# Patient Record
Sex: MALE | Race: WHITE | NOT HISPANIC OR LATINO | Employment: FULL TIME | URBAN - METROPOLITAN AREA
[De-identification: names, ages, dates, MRNs, and addresses within clinical notes are randomized per-mention and may not be internally consistent; named-entity substitution may affect disease eponyms.]

---

## 2021-07-20 ENCOUNTER — OFFICE VISIT (OUTPATIENT)
Dept: URGENT CARE | Facility: PHYSICIAN GROUP | Age: 51
End: 2021-07-20
Payer: COMMERCIAL

## 2021-07-20 ENCOUNTER — HOSPITAL ENCOUNTER (OUTPATIENT)
Dept: RADIOLOGY | Facility: MEDICAL CENTER | Age: 51
End: 2021-07-20
Attending: PHYSICIAN ASSISTANT
Payer: COMMERCIAL

## 2021-07-20 VITALS
WEIGHT: 210 LBS | TEMPERATURE: 98.6 F | RESPIRATION RATE: 16 BRPM | BODY MASS INDEX: 30.06 KG/M2 | SYSTOLIC BLOOD PRESSURE: 120 MMHG | HEIGHT: 70 IN | HEART RATE: 96 BPM | OXYGEN SATURATION: 100 % | DIASTOLIC BLOOD PRESSURE: 60 MMHG

## 2021-07-20 DIAGNOSIS — Z23 NEED FOR DIPHTHERIA-TETANUS-PERTUSSIS (TDAP) VACCINE: ICD-10-CM

## 2021-07-20 DIAGNOSIS — S61.209A AVULSION OF SKIN OF FINGER WITHOUT COMPLICATION, INITIAL ENCOUNTER: ICD-10-CM

## 2021-07-20 DIAGNOSIS — S61.235A PUNCTURE WOUND OF LEFT RING FINGER: ICD-10-CM

## 2021-07-20 PROCEDURE — 73140 X-RAY EXAM OF FINGER(S): CPT | Mod: LT

## 2021-07-20 PROCEDURE — 99203 OFFICE O/P NEW LOW 30 MIN: CPT | Mod: 25 | Performed by: PHYSICIAN ASSISTANT

## 2021-07-20 PROCEDURE — 90471 IMMUNIZATION ADMIN: CPT | Performed by: PHYSICIAN ASSISTANT

## 2021-07-20 PROCEDURE — 90715 TDAP VACCINE 7 YRS/> IM: CPT | Performed by: PHYSICIAN ASSISTANT

## 2021-07-20 ASSESSMENT — ENCOUNTER SYMPTOMS
TINGLING: 0
SENSORY CHANGE: 0

## 2021-07-20 NOTE — PROGRESS NOTES
"Subjective:     Juwan Velez  is a 51 y.o. male who presents for Laceration (drilled finger this morning, just wants a tetnus shot )        Patient identity confirmed using two patient identifiers of last name and date of birth.    Patient presents to urgent care with puncture wound left ring finger following drill accident that occurred earlier this morning at home.  Patient reports that he was drilling with a fair amount of force to install a TV mount when the drill slipped and impaled his left ring finger along the lateral boarder. Patient is in need of tetanus as he believes it has been > 5 years since his last.     Laceration           Review of Systems   Skin:        Flap like avulsion/laceration left ring finger   Neurological: Negative for tingling and sensory change.   All other systems reviewed and are negative.    No Known Allergies  Past medical history, family history, surgical history and social history are reviewed and updated in the record today.     Objective:   /60   Pulse 96   Temp 37 °C (98.6 °F) (Temporal)   Resp 16   Ht 1.778 m (5' 10\")   Wt 95.3 kg (210 lb)   SpO2 100%   BMI 30.13 kg/m²   Physical Exam  Vitals reviewed.   Constitutional:       General: He is not in acute distress.     Appearance: He is well-developed. He is not ill-appearing or toxic-appearing.   HENT:      Head: Normocephalic and atraumatic.      Jaw: There is normal jaw occlusion.      Right Ear: External ear normal.      Left Ear: External ear normal.   Eyes:      General: Lids are normal.      Extraocular Movements: Extraocular movements intact.      Conjunctiva/sclera: Conjunctivae normal.      Pupils: Pupils are equal, round, and reactive to light.   Cardiovascular:      Rate and Rhythm: Normal rate and regular rhythm.      Heart sounds: Normal heart sounds.   Pulmonary:      Effort: Pulmonary effort is normal. No respiratory distress.      Breath sounds: Normal breath sounds.   Musculoskeletal:    "      General: Normal range of motion.      Left hand: Bony tenderness present.        Hands:       Cervical back: Normal range of motion and neck supple.      Comments: Left ring finger lateral border distal to DIP with 1.25 cm flap-like laceration/puncture wound with visible adipose tissue, portion of flap is nonviable as it is extremely thin.  Distal neurovascular intact.  + Tenderness of the tip of the finger.   Skin:     General: Skin is warm and dry.      Findings: No rash.   Neurological:      Mental Status: He is alert and oriented to person, place, and time.      Cranial Nerves: No cranial nerve deficit.      Sensory: No sensory deficit.      Motor: Motor function is intact.      Coordination: Coordination is intact.   Psychiatric:         Attention and Perception: Attention normal.         Mood and Affect: Mood and affect normal.         Speech: Speech normal.         Behavior: Behavior normal.         Thought Content: Thought content normal.         Cognition and Memory: Cognition normal.         Judgment: Judgment normal.          Assessment/Plan:   1. Avulsion of skin of finger without complication, initial encounter  - Tdap =>8yo IM  - DX-FINGER(S) 2+ LEFT; Future    2. Need for diphtheria-tetanus-pertussis (Tdap) vaccine  - DX-FINGER(S) 2+ LEFT; Future    3. Puncture wound of left ring finger  - DX-FINGER(S) 2+ LEFT; Future      Wound is cleansed with Hibiclens and water.  Small portion of nonviable flap is excised.  Tetanus updated today.  Given the forceful nature of the injury as well as bony tenderness of the distal portion of the finger x-rays obtained to rule out open fracture.    Xray is obtained, read by radiologist and reviewed by myself with findings as follows:      RADIOLOGY RESULTS   DX-FINGER(S) 2+ LEFT    Result Date: 7/20/2021 7/20/2021 3:09 PM HISTORY/REASON FOR EXAM:  Pain in fourth digit of left hand after injury to fourth digit of left hand. TECHNIQUE/EXAM DESCRIPTION AND NUMBER OF  VIEWS:   3 views of the LEFT fingers. COMPARISON: None FINDINGS: Bone mineralization is normal.  There is no evidence of fracture or dislocation.  There is no evidence of arthropathy.  Soft tissues are normal.     No evidence of fracture or dislocation.         Keep clean and dry.  Observe closely for signs of infection.  Return for concern for signs for infection.      Differential diagnosis, natural history, supportive care, and indications for immediate follow-up discussed.  Red flag warning symptoms and strict ER/follow-up precautions given.  The patient demonstrated a good understanding and agreed with the treatment plan.    Upon entering exam room I ensured patient was wearing a mask.  This provider wore appropriate PPE throughout entire visit.  Patient wore mask entire visit except for a brief period while examining oropharynx.    Please note that this note was created using voice recognition speech to text software. Every effort has been made to correct obvious errors.  However, I expect there are errors of grammar and possibly context that were not discovered prior to finalizing the note  MAGGIE Eddy PA-C

## 2021-12-29 ENCOUNTER — OFFICE VISIT (OUTPATIENT)
Dept: URGENT CARE | Facility: PHYSICIAN GROUP | Age: 51
End: 2021-12-29
Payer: COMMERCIAL

## 2021-12-29 VITALS
RESPIRATION RATE: 20 BRPM | HEIGHT: 70 IN | OXYGEN SATURATION: 94 % | TEMPERATURE: 97.2 F | SYSTOLIC BLOOD PRESSURE: 114 MMHG | HEART RATE: 83 BPM | WEIGHT: 210 LBS | DIASTOLIC BLOOD PRESSURE: 72 MMHG | BODY MASS INDEX: 30.06 KG/M2

## 2021-12-29 DIAGNOSIS — T50.Z95A ADVERSE EFFECT OF VACCINE, INITIAL ENCOUNTER: ICD-10-CM

## 2021-12-29 DIAGNOSIS — R59.1 LYMPHADENOPATHY: ICD-10-CM

## 2021-12-29 PROCEDURE — 99213 OFFICE O/P EST LOW 20 MIN: CPT | Performed by: NURSE PRACTITIONER

## 2021-12-30 ASSESSMENT — ENCOUNTER SYMPTOMS
CHILLS: 0
FEVER: 0
MYALGIAS: 0
NAUSEA: 0

## 2021-12-30 NOTE — PROGRESS NOTES
Juwan Velez is a 51 y.o. male who presents for Arm Pain (Pt states he got his booster and also his flu on hes left arm now it is swollen under armpit;2x days )      HPI this new problem.  Juwan is 51-year-old male patient presents with left axilla pain.  He received his influenza vaccine and his Covid booster 2 days ago in his left arm.  He then noted that his underarm was swollen and tender.  He feels that they are lymph nodes that are swollen.  Treatments tried none.  No other aggravating alleviating factors.  He is currently got counted to know if this was normal or if this is a complication of his vaccines.  He is feeling healthy and well other than his arm.  He has known fevers, chills, body aches.    Review of Systems   Constitutional: Negative for chills and fever.   Gastrointestinal: Negative for nausea.   Musculoskeletal: Negative for joint pain and myalgias.       Allergies:     No Known Allergies    PMSFS Hx:  Past Medical History:   Diagnosis Date   • Snoring      Past Surgical History:   Procedure Laterality Date   • INGUINAL HERNIA REPAIR ROBOTIC Right 8/2/2016    Procedure: INGUINAL HERNIA REPAIR ROBOTIC with mesh ;  Surgeon: Jeet Cobb M.D.;  Location: SURGERY SAME DAY [facility] ORS;  Service:    • UMBILICAL HERNIA REPAIR N/A 8/2/2016    Procedure: UMBILICAL HERNIA REPAIR Incacerated supraumbilical with mesh ;  Surgeon: Jeet Cobb M.D.;  Location: SURGERY SAME DAY [facility] ORS;  Service:      No family history on file.  Social History     Tobacco Use   • Smoking status: Never Smoker   • Smokeless tobacco: Never Used   Substance Use Topics   • Alcohol use: No     Alcohol/week: 0.0 oz       Problems:   Patient Active Problem List   Diagnosis   • Hernia, inguinal   • Hernia, umbilical, with obstruction       Medications:   Current Outpatient Medications on File Prior to Visit   Medication Sig Dispense Refill   • oxycodone-acetaminophen (PERCOCET) 5-325 MG Tab Take 1-2 Tabs by mouth  "every four hours as needed. (Patient not taking: Reported on 7/20/2021) 35 Tab 0     No current facility-administered medications on file prior to visit.          Objective:     /72 (BP Location: Right arm, Patient Position: Sitting, BP Cuff Size: Adult)   Pulse 83   Temp 36.2 °C (97.2 °F) (Temporal)   Resp 20   Ht 1.778 m (5' 10\")   Wt 95.3 kg (210 lb)   SpO2 94%   BMI 30.13 kg/m²     Physical Exam  Vitals and nursing note reviewed.   Constitutional:       Appearance: Normal appearance. He is normal weight.   Cardiovascular:      Rate and Rhythm: Normal rate.      Pulses: Normal pulses.      Heart sounds: Normal heart sounds.   Pulmonary:      Effort: Pulmonary effort is normal.      Breath sounds: Normal breath sounds.   Chest:   Breasts:      Left: Axillary adenopathy present.       Lymphadenopathy:      Upper Body:      Left upper body: Axillary adenopathy present.   Skin:     General: Skin is warm.      Capillary Refill: Capillary refill takes less than 2 seconds.      Findings: No erythema.   Neurological:      Mental Status: He is alert and oriented to person, place, and time.   Psychiatric:         Mood and Affect: Mood normal.         Behavior: Behavior normal.         Thought Content: Thought content normal.         Assessment /Associated Orders:      1. Adverse effect of vaccine, initial encounter     2. Lymphadenopathy           Medical Decision Making:    Pt is clinically stable at today's acute urgent care visit.  No acute distress noted. Appropriate for outpatient management at this time.   Acute problem today with uncertain prognosis.   Reaction to covid vaccination  OTC  analgesic of choice (acetaminophen or NSAID). Follow manufactures dosing and safety precautions.   Warm cold compresses BiD prn     Advised to follow-up with the primary care provider for recheck, reevaluation, and consideration of further management if necessary.   Discussed management options (risks,benefits, and " alternatives to treatment). Expressed understanding and the treatment plan was agreed upon. Questions were encouraged and answered   Return to urgent care prn if new or worsening sx or if there is no improvement in condition prn.      I personally reviewed prior external notes and test results pertinent to today's visit.  I have independently reviewed and interpreted all diagnostics ordered during this urgent care acute visit.   Time spent evaluating this patient was at least 25 minutes and includes preparing for visit, counseling/education, exam and evaluation, obtaining history, independent interpretation, ordering lab/test/procedures,medication management and documentation.Time does not include separately billable procedures noted .

## 2022-12-06 ENCOUNTER — TELEPHONE (OUTPATIENT)
Dept: SCHEDULING | Facility: IMAGING CENTER | Age: 52
End: 2022-12-06

## 2022-12-14 ENCOUNTER — TELEPHONE (OUTPATIENT)
Dept: MEDICAL GROUP | Facility: LAB | Age: 52
End: 2022-12-14
Payer: COMMERCIAL

## 2022-12-14 NOTE — TELEPHONE ENCOUNTER
Left message for patient to call back regarding pre-visit planning. Please transfer call to [phone].

## 2022-12-14 NOTE — TELEPHONE ENCOUNTER
NEW PATIENT VISIT PRE-VISIT PLANNING    1.  [employer] Patient is checked in Patient Demographics?Yes    2.  Immunizations were updated in Epic using Reconcile Outside Information activity? Yes         3.  Is this appointment scheduled as a Hospital Follow-Up? No    4.  Patient is due for the following Health Maintenance Topics:   Health Maintenance Due   Topic Date Due    IMM HEP B VACCINE (1 of 3 - 3-dose series) Never done    HEPATITIS C SCREENING  Never done    COLORECTAL CANCER SCREENING  Never done    IMM ZOSTER VACCINES (1 of 2) Never done    COVID-19 Vaccine (4 - Booster for Pfizer series) 02/21/2022   5.  Reviewed/Updated the following with patient:          Preferred Pharmacy? No          Preferred Lab? No          Preferred Communication? No          Allergies? No          Medications? NO  6.  Updated Care Team?          DME Company (gait device, O2, CPAP, etc.) NO          Other Specialists (eye doctor, derm, GYN, cardiology, endo, etc): NO    7.  AHA (Puls[employer]) form printed for Provider? N/A

## 2022-12-19 SDOH — ECONOMIC STABILITY: HOUSING INSECURITY: IN THE LAST 12 MONTHS, HOW MANY PLACES HAVE YOU LIVED?: 1

## 2022-12-19 SDOH — ECONOMIC STABILITY: INCOME INSECURITY: HOW HARD IS IT FOR YOU TO PAY FOR THE VERY BASICS LIKE FOOD, HOUSING, MEDICAL CARE, AND HEATING?: NOT VERY HARD

## 2022-12-19 SDOH — ECONOMIC STABILITY: TRANSPORTATION INSECURITY
IN THE PAST 12 MONTHS, HAS THE LACK OF TRANSPORTATION KEPT YOU FROM MEDICAL APPOINTMENTS OR FROM GETTING MEDICATIONS?: NO

## 2022-12-19 SDOH — ECONOMIC STABILITY: INCOME INSECURITY: IN THE LAST 12 MONTHS, WAS THERE A TIME WHEN YOU WERE NOT ABLE TO PAY THE MORTGAGE OR RENT ON TIME?: NO

## 2022-12-19 SDOH — ECONOMIC STABILITY: TRANSPORTATION INSECURITY
IN THE PAST 12 MONTHS, HAS LACK OF RELIABLE TRANSPORTATION KEPT YOU FROM MEDICAL APPOINTMENTS, MEETINGS, WORK OR FROM GETTING THINGS NEEDED FOR DAILY LIVING?: NO

## 2022-12-19 SDOH — HEALTH STABILITY: MENTAL HEALTH
STRESS IS WHEN SOMEONE FEELS TENSE, NERVOUS, ANXIOUS, OR CAN'T SLEEP AT NIGHT BECAUSE THEIR MIND IS TROUBLED. HOW STRESSED ARE YOU?: ONLY A LITTLE

## 2022-12-19 SDOH — ECONOMIC STABILITY: FOOD INSECURITY: WITHIN THE PAST 12 MONTHS, YOU WORRIED THAT YOUR FOOD WOULD RUN OUT BEFORE YOU GOT MONEY TO BUY MORE.: NEVER TRUE

## 2022-12-19 SDOH — ECONOMIC STABILITY: FOOD INSECURITY: WITHIN THE PAST 12 MONTHS, THE FOOD YOU BOUGHT JUST DIDN'T LAST AND YOU DIDN'T HAVE MONEY TO GET MORE.: NEVER TRUE

## 2022-12-19 SDOH — ECONOMIC STABILITY: HOUSING INSECURITY
IN THE LAST 12 MONTHS, WAS THERE A TIME WHEN YOU DID NOT HAVE A STEADY PLACE TO SLEEP OR SLEPT IN A SHELTER (INCLUDING NOW)?: NO

## 2022-12-19 SDOH — ECONOMIC STABILITY: TRANSPORTATION INSECURITY
IN THE PAST 12 MONTHS, HAS LACK OF TRANSPORTATION KEPT YOU FROM MEETINGS, WORK, OR FROM GETTING THINGS NEEDED FOR DAILY LIVING?: NO

## 2022-12-19 SDOH — HEALTH STABILITY: PHYSICAL HEALTH: ON AVERAGE, HOW MANY MINUTES DO YOU ENGAGE IN EXERCISE AT THIS LEVEL?: 30 MIN

## 2022-12-19 SDOH — HEALTH STABILITY: PHYSICAL HEALTH: ON AVERAGE, HOW MANY DAYS PER WEEK DO YOU ENGAGE IN MODERATE TO STRENUOUS EXERCISE (LIKE A BRISK WALK)?: 4 DAYS

## 2022-12-19 ASSESSMENT — SOCIAL DETERMINANTS OF HEALTH (SDOH)
DO YOU BELONG TO ANY CLUBS OR ORGANIZATIONS SUCH AS CHURCH GROUPS UNIONS, FRATERNAL OR ATHLETIC GROUPS, OR SCHOOL GROUPS?: YES
HOW OFTEN DO YOU ATTEND CHURCH OR RELIGIOUS SERVICES?: 1 TO 4 TIMES PER YEAR
HOW OFTEN DO YOU ATTENT MEETINGS OF THE CLUB OR ORGANIZATION YOU BELONG TO?: 1 TO 4 TIMES PER YEAR
HOW OFTEN DO YOU GET TOGETHER WITH FRIENDS OR RELATIVES?: ONCE A WEEK
HOW HARD IS IT FOR YOU TO PAY FOR THE VERY BASICS LIKE FOOD, HOUSING, MEDICAL CARE, AND HEATING?: NOT VERY HARD
WITHIN THE PAST 12 MONTHS, YOU WORRIED THAT YOUR FOOD WOULD RUN OUT BEFORE YOU GOT THE MONEY TO BUY MORE: NEVER TRUE
HOW MANY DRINKS CONTAINING ALCOHOL DO YOU HAVE ON A TYPICAL DAY WHEN YOU ARE DRINKING: PATIENT DOES NOT DRINK
HOW OFTEN DO YOU ATTEND CHURCH OR RELIGIOUS SERVICES?: 1 TO 4 TIMES PER YEAR
DO YOU BELONG TO ANY CLUBS OR ORGANIZATIONS SUCH AS CHURCH GROUPS UNIONS, FRATERNAL OR ATHLETIC GROUPS, OR SCHOOL GROUPS?: YES
IN A TYPICAL WEEK, HOW MANY TIMES DO YOU TALK ON THE PHONE WITH FAMILY, FRIENDS, OR NEIGHBORS?: MORE THAN THREE TIMES A WEEK
HOW OFTEN DO YOU HAVE SIX OR MORE DRINKS ON ONE OCCASION: NEVER
HOW OFTEN DO YOU HAVE A DRINK CONTAINING ALCOHOL: NEVER
HOW OFTEN DO YOU ATTENT MEETINGS OF THE CLUB OR ORGANIZATION YOU BELONG TO?: 1 TO 4 TIMES PER YEAR
HOW OFTEN DO YOU GET TOGETHER WITH FRIENDS OR RELATIVES?: ONCE A WEEK
IN A TYPICAL WEEK, HOW MANY TIMES DO YOU TALK ON THE PHONE WITH FAMILY, FRIENDS, OR NEIGHBORS?: MORE THAN THREE TIMES A WEEK

## 2022-12-19 ASSESSMENT — LIFESTYLE VARIABLES
HOW OFTEN DO YOU HAVE A DRINK CONTAINING ALCOHOL: NEVER
SKIP TO QUESTIONS 9-10: 1
AUDIT-C TOTAL SCORE: 0
HOW OFTEN DO YOU HAVE SIX OR MORE DRINKS ON ONE OCCASION: NEVER
HOW MANY STANDARD DRINKS CONTAINING ALCOHOL DO YOU HAVE ON A TYPICAL DAY: PATIENT DOES NOT DRINK

## 2022-12-22 ENCOUNTER — OFFICE VISIT (OUTPATIENT)
Dept: MEDICAL GROUP | Facility: LAB | Age: 52
End: 2022-12-22
Payer: COMMERCIAL

## 2022-12-22 VITALS
RESPIRATION RATE: 16 BRPM | SYSTOLIC BLOOD PRESSURE: 114 MMHG | DIASTOLIC BLOOD PRESSURE: 64 MMHG | HEART RATE: 72 BPM | TEMPERATURE: 98.2 F | WEIGHT: 214 LBS | HEIGHT: 70 IN | BODY MASS INDEX: 30.64 KG/M2 | OXYGEN SATURATION: 96 %

## 2022-12-22 DIAGNOSIS — E66.09 CLASS 1 OBESITY DUE TO EXCESS CALORIES WITHOUT SERIOUS COMORBIDITY WITH BODY MASS INDEX (BMI) OF 30.0 TO 30.9 IN ADULT: ICD-10-CM

## 2022-12-22 DIAGNOSIS — R53.83 OTHER FATIGUE: ICD-10-CM

## 2022-12-22 DIAGNOSIS — R35.0 URINARY FREQUENCY: ICD-10-CM

## 2022-12-22 DIAGNOSIS — Z12.11 COLON CANCER SCREENING: ICD-10-CM

## 2022-12-22 DIAGNOSIS — G47.39 SLEEP APNEA-LIKE BEHAVIOR: ICD-10-CM

## 2022-12-22 DIAGNOSIS — Z76.89 ENCOUNTER TO ESTABLISH CARE WITH NEW DOCTOR: ICD-10-CM

## 2022-12-22 DIAGNOSIS — D17.9 MULTIPLE LIPOMAS: ICD-10-CM

## 2022-12-22 PROBLEM — E66.811 CLASS 1 OBESITY DUE TO EXCESS CALORIES WITHOUT SERIOUS COMORBIDITY WITH BODY MASS INDEX (BMI) OF 30.0 TO 30.9 IN ADULT: Status: ACTIVE | Noted: 2022-12-22

## 2022-12-22 PROCEDURE — 99214 OFFICE O/P EST MOD 30 MIN: CPT | Performed by: FAMILY MEDICINE

## 2022-12-22 ASSESSMENT — PATIENT HEALTH QUESTIONNAIRE - PHQ9: CLINICAL INTERPRETATION OF PHQ2 SCORE: 0

## 2022-12-22 NOTE — PROGRESS NOTES
CC: Here to establish care    HPI: Established patient, new to me    1. Encounter to establish care with new doctor  Here to establish care today, 52-year-old with no significant past medical history, reviewed past medical problems, past surgical history, family/social history, not taking medication.    2. Class 1 obesity   Chronic ongoing BMI of 30.7, discussed with the patient an appointment to establish new visit for weight management 3. Sleep apnea-like behavior  New concern  Patient told me that his wife has noticed that he stops breathing during sleep and he also gives history of snoring, reports fatigue and tiredness sometimes during the daytime.  Would like to be screened for sleep apnea    4. Colon cancer screening  Never had colonoscopy before, denies changes in bowel habits    5. Other fatigue  Sometimes reports fatigue and tiredness during the daytime    6. Urinary frequency  Chronic ongoing, new to me  Patient said since he was a teenager he used to have episodes of increased urine frequency, when he was evaluated he was told that he has hyperactive prostate.  Patient said sometimes he is having increased frequency without burning sensation.  Denies symptoms at this time.      7.  History of multiple lipomas in different parts of his body.,  Reports no pain or rapid increase in size, he had surgical incision and excision of one of them in the upper extremity, now he has some in his abdominal wall area.    Patient Active Problem List    Diagnosis Date Noted    Encounter to establish care with new doctor 12/22/2022    Class 1 obesity due to excess calories without serious comorbidity with body mass index (BMI) of 30.0 to 30.9 in adult 12/22/2022    Multiple lipomas 12/22/2022    Hernia, inguinal 08/02/2016    Hernia, umbilical, with obstruction 08/02/2016       No current outpatient medications on file.     No current facility-administered medications for this visit.         Allergies as of 12/22/2022     (No Known Allergies)        ROS: Denies any chest pain, Shortness of breath, Changes bowel or bladder, Lower extremity edema.    Physical Exam:  Gen.: Well-developed, well-nourished, no apparent distress,pleasant and cooperative with the examination  Skin:  Warm and dry with good turgor. No rashes or suspicious lesions in visible areas  Eye: PERRLA, conjunctiva and sclera clear, lids normal  HEENT: Normocephalic/atraumatic, sinuses nontender with palpation, TMs clear, nares patent with pink mucosa and clear rhinorrhea, lips without lesions, oropharynx clear.  Neck: Trachea midline,no masses or adenopathy  Thyroid: normal consistency and size. No masses or nodules. Not tender with palpation.  Cor: Regular rate and rhythm without murmur, gallop or rub.  Lungs: Respirations unlabored.Clear to auscultation with equal breath sounds bilaterally. No wheezes, rhonchi.  Abdomen: Soft nontender without hepatosplenomegaly or masses appreciated, normoactive bowel sounds. No hernias.  Extremities: No cyanosis, clubbing or edema, Symmetrical without deformities or malformations. Pulses 2+ and symmetrical both upper and lower extremities  Lymphatic: No abnormal adenopathy of the neck groin or axillae.  Psych: Alert and oriented x 3.Normal affect, judgement,insight and memory.        Assessment and Plan.   52 y.o. male establishing care    1. Encounter to establish care with new doctor  Reviewed medical history as above    2. Class 1 obesity due to excess calories without serious comorbidity with body mass index (BMI) of 30.0 to 30.9 in adult  , Chronic ongoing, patient to schedule an appointment for weight management with me  - Lipid Profile; Future  - HEMOGLOBIN A1C; Future    3. Sleep apnea-like behavior  Patient to follow-up with sleep specialist to rule out sleep apnea  - Referral to Pulmonary and Sleep Medicine    4. Colon cancer screening  - Referral to Gastroenterology    5. Other fatigue  Will do work up to R/O DM, Anemia  , Thyroid disease , Kidney disease    - CBC WITH DIFFERENTIAL; Future  - Comp Metabolic Panel; Future  - TSH WITH REFLEX TO FT4; Future  - Lipid Profile; Future    6. Urinary frequency  Chronic, ongoing.  No symptoms of urinary tract infection.  We will order some labs and further assess.      - PROSTATE SPECIFIC AG SCREENING; Future  - URINALYSIS,CULTURE IF INDICATED; Future    7. Multiple lipomas  Patient declines referral to see the surgeon at this time advised if he feels any  Discomfort pain in the area where he has a lipomas or increasing in size rapidly to report back to me and I can place referral to see the surgeon.       Please note that this dictation was created using voice recognition software. I have worked with consultants from the vendor as well as technical experts from [employer] to optimize the interface. I have made every reasonable attempt to correct obvious errors, but I expect that there are errors of grammar and possibly content that I did not discover before finalizing the note.

## 2023-01-19 ENCOUNTER — TELEPHONE (OUTPATIENT)
Dept: HEALTH INFORMATION MANAGEMENT | Facility: OTHER | Age: 53
End: 2023-01-19
Payer: COMMERCIAL

## 2023-01-26 DIAGNOSIS — E78.49 OTHER HYPERLIPIDEMIA: ICD-10-CM

## 2023-01-26 LAB
ALBUMIN SERPL-MCNC: 4.7 G/DL (ref 4–5)
ALBUMIN/GLOB SERPL: 2.2 {RATIO} (ref 1.2–2.2)
ALP SERPL-CCNC: 77 IU/L (ref 44–121)
ALT SERPL-CCNC: 14 IU/L (ref 0–44)
APPEARANCE UR: ABNORMAL
AST SERPL-CCNC: 17 IU/L (ref 0–40)
BACTERIA #/AREA URNS HPF: NORMAL /[HPF]
BASOPHILS # BLD AUTO: 0.1 X10E3/UL (ref 0–0.2)
BASOPHILS NFR BLD AUTO: 1 %
BILIRUB SERPL-MCNC: 0.5 MG/DL (ref 0–1.2)
BILIRUB UR QL STRIP: NEGATIVE
BUN SERPL-MCNC: 17 MG/DL (ref 6–24)
BUN/CREAT SERPL: 16 (ref 9–20)
CALCIUM SERPL-MCNC: 9.5 MG/DL (ref 8.7–10.2)
CASTS URNS MICRO: NORMAL
CASTS URNS QL MICRO: NORMAL /LPF
CHLORIDE SERPL-SCNC: 102 MMOL/L (ref 96–106)
CHOLEST SERPL-MCNC: 292 MG/DL (ref 100–199)
CO2 SERPL-SCNC: 26 MMOL/L (ref 20–29)
COLOR UR: YELLOW
CREAT SERPL-MCNC: 1.05 MG/DL (ref 0.76–1.27)
CRYSTALS URNS MICRO: NORMAL
EGFRCR SERPLBLD CKD-EPI 2021: 86 ML/MIN/1.73
EOSINOPHIL # BLD AUTO: 0.3 X10E3/UL (ref 0–0.4)
EOSINOPHIL NFR BLD AUTO: 5 %
EPI CELLS #/AREA URNS HPF: NORMAL /HPF (ref 0–10)
ERYTHROCYTE [DISTWIDTH] IN BLOOD BY AUTOMATED COUNT: 13 % (ref 11.6–15.4)
GLOBULIN SER CALC-MCNC: 2.1 G/DL (ref 1.5–4.5)
GLUCOSE SERPL-MCNC: 95 MG/DL (ref 70–99)
GLUCOSE UR QL STRIP: NEGATIVE
HBA1C MFR BLD: 5.3 % (ref 4.8–5.6)
HCT VFR BLD AUTO: 46.4 % (ref 37.5–51)
HDLC SERPL-MCNC: 40 MG/DL
HGB BLD-MCNC: 17.1 G/DL (ref 13–17.7)
HGB UR QL STRIP: NEGATIVE
IMM GRANULOCYTES # BLD AUTO: 0 X10E3/UL (ref 0–0.1)
IMM GRANULOCYTES NFR BLD AUTO: 0 %
IMMATURE CELLS  115398: ABNORMAL
KETONES UR QL STRIP: NEGATIVE
LABORATORY COMMENT REPORT: ABNORMAL
LDLC SERPL CALC-MCNC: 209 MG/DL (ref 0–99)
LEUKOCYTE ESTERASE UR QL STRIP: NEGATIVE
LYMPHOCYTES # BLD AUTO: 2.1 X10E3/UL (ref 0.7–3.1)
LYMPHOCYTES NFR BLD AUTO: 34 %
MCH RBC QN AUTO: 34.2 PG (ref 26.6–33)
MCHC RBC AUTO-ENTMCNC: 36.9 G/DL (ref 31.5–35.7)
MCV RBC AUTO: 93 FL (ref 79–97)
MICRO URNS: ABNORMAL
MICRO URNS: ABNORMAL
MONOCYTES # BLD AUTO: 0.4 X10E3/UL (ref 0.1–0.9)
MONOCYTES NFR BLD AUTO: 7 %
MORPHOLOGY BLD-IMP: ABNORMAL
MUCOUS THREADS URNS QL MICRO: NORMAL
NEUTROPHILS # BLD AUTO: 3.2 X10E3/UL (ref 1.4–7)
NEUTROPHILS NFR BLD AUTO: 53 %
NITRITE UR QL STRIP: NEGATIVE
NRBC BLD AUTO-RTO: ABNORMAL %
PH UR STRIP: 5 [PH] (ref 5–7.5)
PLATELET # BLD AUTO: 308 X10E3/UL (ref 150–450)
POTASSIUM SERPL-SCNC: 4.4 MMOL/L (ref 3.5–5.2)
PROT SERPL-MCNC: 6.8 G/DL (ref 6–8.5)
PROT UR QL STRIP: NEGATIVE
PSA SERPL-MCNC: 0.9 NG/ML (ref 0–4)
RBC # BLD AUTO: 5 X10E6/UL (ref 4.14–5.8)
RBC #/AREA URNS HPF: NORMAL /HPF (ref 0–2)
RENAL EPI CELLS #/AREA URNS HPF: NORMAL /[HPF]
SODIUM SERPL-SCNC: 142 MMOL/L (ref 134–144)
SP GR UR STRIP: 1.02 (ref 1–1.03)
T VAGINALIS URNS QL MICRO: NORMAL
TRIGL SERPL-MCNC: 219 MG/DL (ref 0–149)
TSH SERPL DL<=0.005 MIU/L-ACNC: 3.51 UIU/ML (ref 0.45–4.5)
UNIDENT CRYS URNS QL MICRO: NORMAL
URINALYSIS REFLEX  377202: ABNORMAL
URNS CMNT MICRO: NORMAL
UROBILINOGEN UR STRIP-MCNC: 0.2 MG/DL (ref 0.2–1)
VLDLC SERPL CALC-MCNC: 43 MG/DL (ref 5–40)
WBC # BLD AUTO: 6.1 X10E3/UL (ref 3.4–10.8)
WBC #/AREA URNS HPF: NORMAL /HPF (ref 0–5)
YEAST #/AREA URNS HPF: NORMAL /[HPF]

## 2023-01-26 RX ORDER — ATORVASTATIN CALCIUM 20 MG/1
20 TABLET, FILM COATED ORAL NIGHTLY
Qty: 90 TABLET | Refills: 3 | Status: SHIPPED | OUTPATIENT
Start: 2023-01-26

## 2023-01-31 ENCOUNTER — HOSPITAL ENCOUNTER (OUTPATIENT)
Dept: RADIOLOGY | Facility: MEDICAL CENTER | Age: 53
End: 2023-01-31
Attending: FAMILY MEDICINE
Payer: COMMERCIAL

## 2023-01-31 DIAGNOSIS — E78.49 OTHER HYPERLIPIDEMIA: ICD-10-CM

## 2023-01-31 PROCEDURE — 4410556 CT-CARDIAC SCORING (SELF PAY ONLY)

## 2023-05-08 NOTE — PROGRESS NOTES
"CC: \"Check sleeping\"    HPI:  Juwan Velez is a 53 y.o. [employer] employee kindly referred by Fern Hough M.D. who elicited a history of witnessed apneas daytime fatigue and tiredness and snoring.    Has always snored even after tonsillectomy in HS. Side-sleeper. Has 2 small dogs that sleep with he and his wife. HS son comes in in the AM to get ready for school. Lots of disruptions.    He briefly describes his sleep problem as \"wife says I hold my breath at night, constantly moving and snoring.\"  He rates his severity as 6 out of 10.    He works from 7:30-5 p.m.  He goes to bed at 10-11 PM and gets up at 6 AM.  He has a regular bed partner.  He estimates his sleep latency to be 10 to 20 minutes.  He may watch TV or listen to music just prior to turning out the lights and attempting to go to sleep.  He experiences 2-3 nocturnal awakenings and reports 1-2 episodes of nocturia.    Symptoms include waking up too early in the morning and being unable to return to sleep at times, sometimes sleepiness during the day, sometimes too little sleep at night, sometimes tiredness during the day which she attributes to poor sleep at night.  He snores but denies restless legs.    Both parents use oxygen and sleep in a recliners.      Past medical history significant for dyslipidemia, multiple lipomas, obesity, and never smoker.              Patient Active Problem List    Diagnosis Date Noted    Other hyperlipidemia 01/26/2023    Encounter to establish care with new doctor 12/22/2022    Class 1 obesity due to excess calories without serious comorbidity with body mass index (BMI) of 30.0 to 30.9 in adult 12/22/2022    Multiple lipomas 12/22/2022    Hernia, inguinal 08/02/2016    Hernia, umbilical, with obstruction 08/02/2016       Past Medical History:   Diagnosis Date    Chickenpox     Hyperlipidemia     Kidney stone     Ringing in ears     Snoring     Tonsillitis     Wears glasses         Past Surgical History: "   Procedure Laterality Date    INGUINAL HERNIA REPAIR ROBOTIC Right 08/02/2016    Procedure: INGUINAL HERNIA REPAIR ROBOTIC with mesh ;  Surgeon: Jeet Cobb M.D.;  Location: SURGERY SAME DAY [facility];  Service:     UMBILICAL HERNIA REPAIR N/A 08/02/2016    Procedure: UMBILICAL HERNIA REPAIR Incacerated supraumbilical with mesh ;  Surgeon: Jeet Cobb M.D.;  Location: SURGERY SAME DAY [facility];  Service:     TONSILLECTOMY         Family History   Problem Relation Age of Onset    Cancer Mother         breast ca    Breast Cancer Mother         Both breasts removed    Hypertension Mother     Hypertension Brother        Social History     Socioeconomic History    Marital status:      Spouse name: Not on file    Number of children: Not on file    Years of education: Not on file    Highest education level: Bachelor's degree (e.g., BA, AB, BS)   Occupational History     Comment: senior logestic job   Tobacco Use    Smoking status: Never    Smokeless tobacco: Never   Vaping Use    Vaping Use: Never used   Substance and Sexual Activity    Alcohol use: No    Drug use: No    Sexual activity: Yes     Partners: Female     Birth control/protection: I.U.D.     Comment: wife   Other Topics Concern    Not on file   Social History Narrative    Not on file     Social Determinants of Health     Financial Resource Strain: Low Risk     Difficulty of Paying Living Expenses: Not very hard   Food Insecurity: No Food Insecurity    Worried About Running Out of Food in the Last Year: Never true    Ran Out of Food in the Last Year: Never true   Transportation Needs: No Transportation Needs    Lack of Transportation (Medical): No    Lack of Transportation (Non-Medical): No   Physical Activity: Insufficiently Active    Days of Exercise per Week: 4 days    Minutes of Exercise per Session: 30 min   Stress: No Stress Concern Present    Feeling of Stress : Only a little   Social Connections: Socially Integrated    Frequency of  "Communication with Friends and Family: More than three times a week    Frequency of Social Gatherings with Friends and Family: Once a week    Attends Jewish Services: 1 to 4 times per year    Active Member of Clubs or Organizations: Yes    Attends Club or Organization Meetings: 1 to 4 times per year    Marital Status:    Intimate Partner Violence: Not on file   Housing Stability: Low Risk     Unable to Pay for Housing in the Last Year: No    Number of Places Lived in the Last Year: 1    Unstable Housing in the Last Year: No       Current Outpatient Medications   Medication Sig Dispense Refill    atorvastatin (LIPITOR) 20 MG Tab Take 1 Tablet by mouth every evening. 90 Tablet 3     No current facility-administered medications for this visit.    \"CURRENT RX\"    ALLERGIES: Patient has no known allergies.    ROS    Constitutional: Denies fever, chills, sweats,  weight loss, fatigue.  Eyes: Denies vision loss, pain, drainage, double vision, glasses.  Ears/Nose/Mouth/Throat: Denies earache, difficulty hearing, rhinitis/nasal congestion, injury, recurrent sore throat, persistent hoarseness, decayed teeth/toothaches, ringing or buzzing in the ears.  Cardiovascular: Denies chest pain, tightness, palpitations, swelling in legs/feet, fainting, difficulty breathing when lying down but gets better when sitting up.   Respiratory: Denies shortness of breath, cough, sputum, wheezing, painful breathing, coughing up blood.   Sleep: per HPI  Gastrointestinal: Denies  difficulty swallowing, nausea, abdominal pain, diarrhea, constipation, heartburn.  Genitourinary: Denies  blood in urine, discharge, frequent urination.   Musculoskeletal: Denies painful joints, sore muscles, back pain.   Integumentary: Denies rashes, lumps, color changes.   Neurological: Denies frequent headaches,weakness, dizziness.    PHYSICAL EXAM  Obese    /72 (BP Location: Left arm, Patient Position: Sitting, BP Cuff Size: Adult)   Pulse 89   Resp " "16   Ht 1.778 m (5' 10\")   Wt 98.4 kg (217 lb)   SpO2 92%   BMI 31.14 kg/m²   Appearance: Well-nourished, well-developed, no acute distress  Eyes:  PERRLA, EOMI  ENMT: Mallampati 3  Neck: Supple, trachea midline, no masses  Respiratory effort:  No intercostal retractions or use of accessory muscles  Lung auscultation:  No wheezes rhonchi rubs or rales  Cardiac: No murmurs, rubs, or gallops; regular rhythm, normal rate; no edema  Abdomen:  No tenderness, no organomegaly  Musculoskeletal:  Grossly normal; gait and station normal; digits and nails normal  Skin:  No rashes, petechiae, cyanosis  Neurologic: without focal signs; oriented to person, time, place, and purpose; judgement intact  Psychiatric:  No depression, anxiety, agitation        Medical Decision Making:  The medical record was reviewed in its entirety including the referral notes, records from primary care, consultants notes, hospital records, labs, imaging, microbiology, immunology, and immunizations.  Care gaps identified and reviewed with the patient.    Diagnostic and titration nocturnal polysomnograms, home sleep apnea tests, continuous nocturnal oximetry results, multiple sleep latency tests, and compliance reports reviewed.        1. ISSAC (obstructive sleep apnea)  - Overnight Home Sleep Study; Future      PLAN:   The patient has signs and symptoms consistent with obstructive sleep apnea hypopnea syndrome. Will schedule a nocturnal polysomnogram or a home sleep apnea test depending on signs and symptoms as well as insurance restrictions.    The risks of untreated sleep apnea were discussed with the patient at length. Patients with ISSAC are at increased risk of cardiovascular disease including systemic arterial hypertension, pulmonary arterial hypertension (transient or fixed), TIA, and an elevated risk of stroke, heart attack, sudden death, or arrhythmia between the hours of 11 PM and 6 AM. ISSAC patients have an increased risk of motor vehicle " accidents, type 2 diabetes, GERD, repetitive mechanical trauma to pharyngeal muscles with inflammation and denervation, frequent EEG arousals leading to nonrestorative sleep, excessive daytime sleepiness, fatigue, depression, poor pain control, irritability, and lower quality of life.  The patient was advised to avoid driving a motor vehicle when drowsy.    Positive airway pressure will favorably impact many of the adverse conditions and effects provoked by ISSAC.    Have advised the patient to follow up with the appropriate healthcare practitioners for all other medical problems and issues. Discussed blood pressure management if needed. Discussed depression screening.            Return for after sleep study.    Total time 45 minutes

## 2023-05-09 ENCOUNTER — OFFICE VISIT (OUTPATIENT)
Dept: SLEEP MEDICINE | Facility: MEDICAL CENTER | Age: 53
End: 2023-05-09
Attending: FAMILY MEDICINE
Payer: COMMERCIAL

## 2023-05-09 VITALS
HEART RATE: 89 BPM | WEIGHT: 217 LBS | BODY MASS INDEX: 31.07 KG/M2 | RESPIRATION RATE: 16 BRPM | SYSTOLIC BLOOD PRESSURE: 124 MMHG | DIASTOLIC BLOOD PRESSURE: 72 MMHG | HEIGHT: 70 IN | OXYGEN SATURATION: 92 %

## 2023-05-09 DIAGNOSIS — G47.33 OSA (OBSTRUCTIVE SLEEP APNEA): ICD-10-CM

## 2023-05-09 PROCEDURE — 99204 OFFICE O/P NEW MOD 45 MIN: CPT | Performed by: INTERNAL MEDICINE

## 2023-05-09 PROCEDURE — 99212 OFFICE O/P EST SF 10 MIN: CPT | Performed by: INTERNAL MEDICINE

## 2023-05-09 ASSESSMENT — FIBROSIS 4 INDEX: FIB4 SCORE: 0.75

## 2023-05-09 ASSESSMENT — PATIENT HEALTH QUESTIONNAIRE - PHQ9: CLINICAL INTERPRETATION OF PHQ2 SCORE: 0

## 2023-05-30 ENCOUNTER — HOME STUDY (OUTPATIENT)
Dept: SLEEP MEDICINE | Facility: MEDICAL CENTER | Age: 53
End: 2023-05-30
Attending: INTERNAL MEDICINE
Payer: COMMERCIAL

## 2023-05-30 DIAGNOSIS — G47.33 OSA (OBSTRUCTIVE SLEEP APNEA): ICD-10-CM

## 2023-05-30 PROCEDURE — 95800 SLP STDY UNATTENDED: CPT | Performed by: INTERNAL MEDICINE

## 2023-06-03 NOTE — PROCEDURES
DIAGNOSTIC HOME SLEEP TEST (HST) REPORT WatchPAT      PATIENT ID:  NAME:  Juwan Velez  MRN:               [id number]  YOB: 1970  Date of study: 05/30/2023  Sleep Time: 4 hours 53 minutes  Portable monitor/device used: type IV (portable monitor/device)      Impression:        1.  Moderate obstructive sleep apnea hypopnea with a PAT apnea hypopnea index (pAHI) of 24.7 (events per hour), a supine pAHI of 21.4,   and a PAT respiratory disturbance index (pRDI) of 25.1 (events per hour.)  These findings are based on 7 channels recording of PAT signal with sleep staging, heart rate, pulse oximetry, actigraphy, body position, snoring and respiratory movement.     2. Oxygenation: O2 Sat. mean O2 sat was 90%,  the nedra was 83%,  and the maximum O2 was 97%.  The O2 sat was at or  below 88% for 19.3 minutes of evaluation time. Oxygen Desaturation (>=4%) Index was 11.8 (events per hour.)  The mean HR was 69 BPM.      TECHNICAL DESCRIPTION: The patient underwent home sleep apnea testing using peripheral arterial tone signal technology(WatchPAT™). Monitoring was done with 7 channels recording of PAT signal with sleep staging, heart rate, pulse oximetry, actigraphy, body position, snoring and respiratory movement. Prior to using the device, the patient received verbal and written instructions for its application and was provided with the help desk phone number for additional telephonic instruction with 24-hour availability of qualified personnel to answer questions.      Respiratory events:    pRDI: Total 121 (REM index 41.6 /hr, NREM index 20.1 /hr, All Night 25.1 /hr)    3% pAHI: Total 119 (REM index 40.7 /hr, NREM index 19.8 /hr, All Night 24.7 /hr)    3% pAHIc: Total 7 (REM index 1.8 /hr, NREM index 1.4 /hr, All Night 1.5 /hr)    4% pAHI: Total 68 (All Night 14.1 /hr)    General sleep summary: . Total Recording Time is 6 hours 10 minutes and Total Sleep Time is 4 hours 53 minutes. The patient spent 169.4  minutes in the supine position and 124.5 minutes in the nonsupine position.  Snoring exceeded the 45 dB threshold for 16.4 minutes or 5.6% of the TST.      Recommendations:    1.  Options for PAP therapy include an in lab attended Pap titration versus an empiric challenge with auto titrating CPAP.  2.  Patients with sleep apnea are advised to avoid alcohol and sedatives and not to operate a motor vehicle while drowsy. In some cases alternative treatment options may prove effective in managing sleep apnea.  Alternative therapies include upper airway surgery, the use of a dental orthotic, weight loss and/or positional therapy. Clinical correlation is required.      NOTES:     The AHI is the number of apneas (cessation of airflow for 10 seconds or longer) and hypopneas (shallow breaths accompanied by at least a 3% drop in the oxygen saturation) that occur per hour. Less than 5 per hour is normal, 5-15 per hour is mild, 15-30 per hour moderate, and greater than 30 per hour severe.    Patients with sleep apnea are at increased risk of cardiovascular disease including systemic arterial hypertension, pulmonary arterial hypertension, (transient or fixed), transient ischemic attacks, and an elevated risk of stroke, heart attack, sudden death, or arrhythmia between the hours of 11 PM and 6 AM.  Sleep apnea patients have an increased risk of motor vehicle accidents, type 2 diabetes, gastroesophageal reflux, repetitive mechanical trauma to pharyngeal muscles with inflammation and denervation, frequent EEG arousals leading to nonrestorative sleep, excessive daytime somnolence, fatigue, depression, poor pain control, irritability, and a lower quality of life.      Dr. Kendrick Quiroz M.D.

## 2023-06-05 NOTE — PROGRESS NOTES
[facility] Follow-up Visit    Date of Visit: 6/20/2023     CC:  Follow-up for ISSAC management      HPI:  Juwan Velez is a very pleasant 53 y.o. year old male never smoker, with a PMHx of ISSAC, DSL, multiple lipomas, elevated BMI who presented to the [facility] for a regular follow up. Last seen in the office on 5/9/2023 with Dr. Quiroz.     Patient presents for sleep study results.  Patient states that he experiences snoring, apneas.  He denies any significant morning headaches, daytime/driving drowsy, issues by sleep, gasping, palpitations, dry mouth.  He goes to bed 11 PM and wakes up at 5:30 AM.  He will rarely awaken at night and does not nap.      Sleep History:  HSS 5/9/2023-        Patient Active Problem List    Diagnosis Date Noted    ISSAC on CPAP 06/20/2023    Other hyperlipidemia 01/26/2023    Encounter to establish care with new doctor 12/22/2022    Class 1 obesity due to excess calories without serious comorbidity with body mass index (BMI) of 30.0 to 30.9 in adult 12/22/2022    Multiple lipomas 12/22/2022    Hernia, inguinal 08/02/2016    Hernia, umbilical, with obstruction 08/02/2016     Past Medical History:   Diagnosis Date    Chickenpox     Hyperlipidemia     Kidney stone     Ringing in ears     Snoring     Tonsillitis     Wears glasses       Past Surgical History:   Procedure Laterality Date    INGUINAL HERNIA REPAIR ROBOTIC Right 08/02/2016    Procedure: INGUINAL HERNIA REPAIR ROBOTIC with mesh ;  Surgeon: Jeet Cobb M.D.;  Location: SURGERY SAME DAY [facility];  Service:     UMBILICAL HERNIA REPAIR N/A 08/02/2016    Procedure: UMBILICAL HERNIA REPAIR Incacerated supraumbilical with mesh ;  Surgeon: Jeet Cobb M.D.;  Location: SURGERY SAME DAY [facility];  Service:     TONSILLECTOMY       Family History   Problem Relation Age of Onset    Cancer Mother         breast ca    Breast Cancer Mother         Both breasts removed    Hypertension Mother     Hypertension  Brother      Social History     Socioeconomic History    Marital status:      Spouse name: Not on file    Number of children: Not on file    Years of education: Not on file    Highest education level: Bachelor's degree (e.g., BA, AB, BS)   Occupational History     Comment: senior logestic job   Tobacco Use    Smoking status: Never    Smokeless tobacco: Never   Vaping Use    Vaping Use: Never used   Substance and Sexual Activity    Alcohol use: No    Drug use: No    Sexual activity: Yes     Partners: Female     Birth control/protection: I.U.D.     Comment: wife   Other Topics Concern    Not on file   Social History Narrative    Not on file     Social Determinants of Health     Financial Resource Strain: Low Risk  (12/19/2022)    Overall Financial Resource Strain (CARDIA)     Difficulty of Paying Living Expenses: Not very hard   Food Insecurity: No Food Insecurity (12/19/2022)    Hunger Vital Sign     Worried About Running Out of Food in the Last Year: Never true     Ran Out of Food in the Last Year: Never true   Transportation Needs: No Transportation Needs (12/19/2022)    PRAPARE - Transportation     Lack of Transportation (Medical): No     Lack of Transportation (Non-Medical): No   Physical Activity: Insufficiently Active (12/19/2022)    Exercise Vital Sign     Days of Exercise per Week: 4 days     Minutes of Exercise per Session: 30 min   Stress: No Stress Concern Present (12/19/2022)    [employer] - Occupational Stress Questionnaire     Feeling of Stress : Only a little   Social Connections: Socially Integrated (12/19/2022)    Social Connection and Isolation Panel [NHANES]     Frequency of Communication with Friends and Family: More than three times a week     Frequency of Social Gatherings with Friends and Family: Once a week     Attends Jainism Services: 1 to 4 times per year     Active Member of Clubs or Organizations: Yes     Attends Club or Organization Meetings: 1 to 4  "times per year     Marital Status:    Intimate Partner Violence: Not on file   Housing Stability: Low Risk  (12/19/2022)    Housing Stability Vital Sign     Unable to Pay for Housing in the Last Year: No     Number of Places Lived in the Last Year: 1     Unstable Housing in the Last Year: No     Current Outpatient Medications   Medication Sig Dispense Refill    atorvastatin (LIPITOR) 20 MG Tab Take 1 Tablet by mouth every evening. 90 Tablet 3     No current facility-administered medications for this visit.      ALLERGIES: Patient has no known allergies.    ROS:  Constitutional: Denies fever, chills, sweats,  weight loss, fatigue  Cardiovascular: Denies chest pain, tightness, palpitations, swelling in legs/feet  Respiratory: Denies shortness of breath, cough, sputum, wheezing, painful breathing   Sleep: per HPI  Gastrointestinal: Denies  difficulty swallowing, nausea, abdominal pain, diarrhea, constipation, heartburn.  Musculoskeletal: Denies painful joints, sore muscles,       PHYSICAL EXAM:  /68 (BP Location: Right arm, Patient Position: Sitting, BP Cuff Size: Adult)   Pulse 76   Resp 16   Ht 1.778 m (5' 10\")   Wt 97.1 kg (214 lb)   SpO2 95% Comment: room air at rest  BMI 30.71 kg/m²   Appearance: Well-nourished, well-developed, no acute distress  Eyes:  No scleral icterus , EOMI  ENMT: No redness of the oropharynx  Lung auscultation:  No wheezes rhonchi rubs or rales  Cardiac: No murmurs, rubs, or gallops; regular rhythm, normal rate; no edema  Musculoskeletal:  Grossly normal; gait and station normal; digits and nails normal  Skin:  No rashes, petechiae, cyanosis  Neurologic: without focal signs; oriented to person, time, place, and purpose; judgement intact  Psychiatric:  No depression, anxiety, agitation  Mallampati score: Class III    Assessment and Plan:    The medical record was reviewed.    Diagnostic and titration nocturnal polysomnogram's, home sleep apnea tests, continuous nocturnal " oximetry results, multiple sleep latency tests, and compliance reports reviewed.    Problem List Items Addressed This Visit       ISSAC on CPAP     Sleep Apnea:    The pathophysiology of sleep anea and the increased risk of cardiovascular morbidity from untreated sleep apnea is discussed in detail with the patient.  Urged to avoid supine sleep, weight gain and alcoholic beverages since all of these can worsen sleep apnea. Cautioned against drowsy driving. If feeling sleepy while driving, pull over for a break/nap, rather than persist on the road, in the interest of own safety and that of others on the road.    HSS was reviewed and discussed with the patient.   Spent significant time defining sleep apnea, risk of untreated sleep apnea, treatment options, process for obtaining a CPAP, and follow-up parameters.    - Order placed for AutoCPAP 5-15 CWP to [facility]  - OPO once compliant  - Compliance was reinforced  - Recommended the patient against the use of Ozone , such as SoClean  - Clean supplies a couple times a week with dish soap and water and air dry  - Recommended the patient change out supplies as recommended for best mask fit and usage of the machine  - Advised patient to reach out via [employer] if any questions or concerns should arise.   - Equipment replacement schedule:  Mask cushion every month  Nasal pillows 2 times per month  Mask every 6 months  Head gear every 6 months  Tubing every 3 months  Ultra-fine filters 2 times per month  Foam filter every 6 months  Humidifier chamber every 6 months  Chin strap every 6 months    Has been advised to start CPAP, clean equipment frequently, and get new mask and supplies as allowed by insurance and DME. Recommend an earlier appointment, if significant treatment barriers develop.    The risks of untreated sleep apnea were discussed with the patient at length. Patients with sleep apnea are at increased risk of cardiovascular disease including coronary artery  disease, systemic arterial hypertension, pulmonary arterial hypertension, cardiac arrythmias, and stroke. The patient was advised to avoid driving a motor vehicle when drowsy.    Positive airway pressure will favorably impact many of the adverse conditions and effects provoked by sleep apnea.           Relevant Orders    DME CPAP     Have advised the patient to follow up with the appropriate healthcare practitioners for all other medical problems and issues.    Return in about 3 months (around 9/20/2023), or if symptoms worsen or fail to improve, for 1st compliance , with oRsai.    Please note portions of this record was created using voice recognition software. I have made every reasonable attempt to correct obvious errors, but I expect that there are errors of grammar and possibly content I did not discover before finalizing the note.    Time spent in record review prior to patient arrival, reviewing results, and in face-to-face encounter totaled 25 min.  __________  JOSE Colon  Pulmonary & Sleep Medicine  [facility]

## 2023-06-20 ENCOUNTER — OFFICE VISIT (OUTPATIENT)
Dept: SLEEP MEDICINE | Facility: MEDICAL CENTER | Age: 53
End: 2023-06-20
Payer: COMMERCIAL

## 2023-06-20 VITALS
HEART RATE: 76 BPM | RESPIRATION RATE: 16 BRPM | WEIGHT: 214 LBS | BODY MASS INDEX: 30.64 KG/M2 | HEIGHT: 70 IN | OXYGEN SATURATION: 95 % | SYSTOLIC BLOOD PRESSURE: 118 MMHG | DIASTOLIC BLOOD PRESSURE: 68 MMHG

## 2023-06-20 DIAGNOSIS — G47.33 OSA ON CPAP: ICD-10-CM

## 2023-06-20 PROCEDURE — 3074F SYST BP LT 130 MM HG: CPT

## 2023-06-20 PROCEDURE — 99213 OFFICE O/P EST LOW 20 MIN: CPT

## 2023-06-20 PROCEDURE — 99211 OFF/OP EST MAY X REQ PHY/QHP: CPT

## 2023-06-20 PROCEDURE — 3078F DIAST BP <80 MM HG: CPT

## 2023-06-20 ASSESSMENT — FIBROSIS 4 INDEX: FIB4 SCORE: 0.75

## 2023-06-20 NOTE — ASSESSMENT & PLAN NOTE
Sleep Apnea:    The pathophysiology of sleep anea and the increased risk of cardiovascular morbidity from untreated sleep apnea is discussed in detail with the patient.  Urged to avoid supine sleep, weight gain and alcoholic beverages since all of these can worsen sleep apnea. Cautioned against drowsy driving. If feeling sleepy while driving, pull over for a break/nap, rather than persist on the road, in the interest of own safety and that of others on the road.    HSS was reviewed and discussed with the patient.   Spent significant time defining sleep apnea, risk of untreated sleep apnea, treatment options, process for obtaining a CPAP, and follow-up parameters.    - Order placed for AutoCPAP 5-15 CWP to [facility]  - OPO once compliant  - Compliance was reinforced  - Recommended the patient against the use of Ozone , such as SoClean  - Clean supplies a couple times a week with dish soap and water and air dry  - Recommended the patient change out supplies as recommended for best mask fit and usage of the machine  - Advised patient to reach out via MyChart if any questions or concerns should arise.   - Equipment replacement schedule:  Mask cushion every month  Nasal pillows 2 times per month  Mask every 6 months  Head gear every 6 months  Tubing every 3 months  Ultra-fine filters 2 times per month  Foam filter every 6 months  Humidifier chamber every 6 months  Chin strap every 6 months    Has been advised to start CPAP, clean equipment frequently, and get new mask and supplies as allowed by insurance and DME. Recommend an earlier appointment, if significant treatment barriers develop.    The risks of untreated sleep apnea were discussed with the patient at length. Patients with sleep apnea are at increased risk of cardiovascular disease including coronary artery disease, systemic arterial hypertension, pulmonary arterial hypertension, cardiac arrythmias, and stroke. The patient was advised to avoid driving a  motor vehicle when drowsy.    Positive airway pressure will favorably impact many of the adverse conditions and effects provoked by sleep apnea.

## 2023-06-20 NOTE — PATIENT INSTRUCTIONS
Please make sure that you are seen within 90 days of receiving your machine, with at least 30 days of use.  Please call scheduling at [phone] if your appointment needs to be moved to meet these parameters.     To meet compliance requirements for insurance please ensure that you use the machine at least 6/7 days a week for at least 4 or more hours a night.    I advise patients to research different mask options before picking up the new machine.  You should also ask what the DME company's return policy is for the mask because if you do not like the mask and don't return it within that time frame, you will have to wait 6 months for insurance to cover another mask.     Please bring your entire machine and chip to your first appointment, regardless if the machine is set up for wireless access.

## 2023-09-21 ENCOUNTER — APPOINTMENT (OUTPATIENT)
Dept: SLEEP MEDICINE | Facility: MEDICAL CENTER | Age: 53
End: 2023-09-21
Payer: COMMERCIAL

## 2023-12-19 SDOH — ECONOMIC STABILITY: FOOD INSECURITY: WITHIN THE PAST 12 MONTHS, THE FOOD YOU BOUGHT JUST DIDN'T LAST AND YOU DIDN'T HAVE MONEY TO GET MORE.: NEVER TRUE

## 2023-12-19 SDOH — ECONOMIC STABILITY: INCOME INSECURITY: HOW HARD IS IT FOR YOU TO PAY FOR THE VERY BASICS LIKE FOOD, HOUSING, MEDICAL CARE, AND HEATING?: NOT VERY HARD

## 2023-12-19 SDOH — HEALTH STABILITY: PHYSICAL HEALTH: ON AVERAGE, HOW MANY DAYS PER WEEK DO YOU ENGAGE IN MODERATE TO STRENUOUS EXERCISE (LIKE A BRISK WALK)?: 3 DAYS

## 2023-12-19 SDOH — HEALTH STABILITY: PHYSICAL HEALTH: ON AVERAGE, HOW MANY MINUTES DO YOU ENGAGE IN EXERCISE AT THIS LEVEL?: 60 MIN

## 2023-12-19 SDOH — ECONOMIC STABILITY: HOUSING INSECURITY: IN THE LAST 12 MONTHS, HOW MANY PLACES HAVE YOU LIVED?: 1

## 2023-12-19 SDOH — ECONOMIC STABILITY: INCOME INSECURITY: IN THE LAST 12 MONTHS, WAS THERE A TIME WHEN YOU WERE NOT ABLE TO PAY THE MORTGAGE OR RENT ON TIME?: NO

## 2023-12-19 SDOH — ECONOMIC STABILITY: FOOD INSECURITY: WITHIN THE PAST 12 MONTHS, YOU WORRIED THAT YOUR FOOD WOULD RUN OUT BEFORE YOU GOT MONEY TO BUY MORE.: NEVER TRUE

## 2023-12-19 SDOH — HEALTH STABILITY: MENTAL HEALTH
STRESS IS WHEN SOMEONE FEELS TENSE, NERVOUS, ANXIOUS, OR CAN'T SLEEP AT NIGHT BECAUSE THEIR MIND IS TROUBLED. HOW STRESSED ARE YOU?: NOT AT ALL

## 2023-12-19 ASSESSMENT — LIFESTYLE VARIABLES
HOW OFTEN DO YOU HAVE SIX OR MORE DRINKS ON ONE OCCASION: NEVER
HOW MANY STANDARD DRINKS CONTAINING ALCOHOL DO YOU HAVE ON A TYPICAL DAY: PATIENT DOES NOT DRINK
HOW OFTEN DO YOU HAVE A DRINK CONTAINING ALCOHOL: NEVER
AUDIT-C TOTAL SCORE: 0
SKIP TO QUESTIONS 9-10: 1

## 2023-12-19 ASSESSMENT — SOCIAL DETERMINANTS OF HEALTH (SDOH)
HOW OFTEN DO YOU GET TOGETHER WITH FRIENDS OR RELATIVES?: TWICE A WEEK
WITHIN THE PAST 12 MONTHS, YOU WORRIED THAT YOUR FOOD WOULD RUN OUT BEFORE YOU GOT THE MONEY TO BUY MORE: NEVER TRUE
HOW OFTEN DO YOU HAVE A DRINK CONTAINING ALCOHOL: NEVER
HOW OFTEN DO YOU HAVE SIX OR MORE DRINKS ON ONE OCCASION: NEVER
HOW HARD IS IT FOR YOU TO PAY FOR THE VERY BASICS LIKE FOOD, HOUSING, MEDICAL CARE, AND HEATING?: NOT VERY HARD
HOW OFTEN DO YOU ATTENT MEETINGS OF THE CLUB OR ORGANIZATION YOU BELONG TO?: MORE THAN 4 TIMES PER YEAR
IN A TYPICAL WEEK, HOW MANY TIMES DO YOU TALK ON THE PHONE WITH FAMILY, FRIENDS, OR NEIGHBORS?: MORE THAN THREE TIMES A WEEK
HOW OFTEN DO YOU ATTEND CHURCH OR RELIGIOUS SERVICES?: MORE THAN 4 TIMES PER YEAR
IN A TYPICAL WEEK, HOW MANY TIMES DO YOU TALK ON THE PHONE WITH FAMILY, FRIENDS, OR NEIGHBORS?: MORE THAN THREE TIMES A WEEK
HOW OFTEN DO YOU GET TOGETHER WITH FRIENDS OR RELATIVES?: TWICE A WEEK
HOW OFTEN DO YOU ATTENT MEETINGS OF THE CLUB OR ORGANIZATION YOU BELONG TO?: MORE THAN 4 TIMES PER YEAR
DO YOU BELONG TO ANY CLUBS OR ORGANIZATIONS SUCH AS CHURCH GROUPS UNIONS, FRATERNAL OR ATHLETIC GROUPS, OR SCHOOL GROUPS?: YES
DO YOU BELONG TO ANY CLUBS OR ORGANIZATIONS SUCH AS CHURCH GROUPS UNIONS, FRATERNAL OR ATHLETIC GROUPS, OR SCHOOL GROUPS?: YES
HOW MANY DRINKS CONTAINING ALCOHOL DO YOU HAVE ON A TYPICAL DAY WHEN YOU ARE DRINKING: PATIENT DOES NOT DRINK
HOW OFTEN DO YOU ATTEND CHURCH OR RELIGIOUS SERVICES?: MORE THAN 4 TIMES PER YEAR

## 2023-12-22 ENCOUNTER — OFFICE VISIT (OUTPATIENT)
Dept: MEDICAL GROUP | Facility: LAB | Age: 53
End: 2023-12-22
Payer: COMMERCIAL

## 2023-12-22 VITALS
SYSTOLIC BLOOD PRESSURE: 108 MMHG | TEMPERATURE: 97.9 F | HEIGHT: 70 IN | DIASTOLIC BLOOD PRESSURE: 62 MMHG | RESPIRATION RATE: 16 BRPM | OXYGEN SATURATION: 96 % | BODY MASS INDEX: 31.5 KG/M2 | WEIGHT: 220 LBS | HEART RATE: 70 BPM

## 2023-12-22 DIAGNOSIS — E66.09 CLASS 1 OBESITY DUE TO EXCESS CALORIES WITHOUT SERIOUS COMORBIDITY WITH BODY MASS INDEX (BMI) OF 30.0 TO 30.9 IN ADULT: ICD-10-CM

## 2023-12-22 DIAGNOSIS — Z13.29 THYROID DISORDER SCREEN: ICD-10-CM

## 2023-12-22 DIAGNOSIS — E78.49 OTHER HYPERLIPIDEMIA: ICD-10-CM

## 2023-12-22 DIAGNOSIS — Z00.00 ANNUAL PHYSICAL EXAM: ICD-10-CM

## 2023-12-22 DIAGNOSIS — Z13.0 SCREENING FOR DEFICIENCY ANEMIA: ICD-10-CM

## 2023-12-22 PROCEDURE — 99396 PREV VISIT EST AGE 40-64: CPT | Performed by: FAMILY MEDICINE

## 2023-12-22 PROCEDURE — 3074F SYST BP LT 130 MM HG: CPT | Performed by: FAMILY MEDICINE

## 2023-12-22 PROCEDURE — 3078F DIAST BP <80 MM HG: CPT | Performed by: FAMILY MEDICINE

## 2023-12-22 ASSESSMENT — FIBROSIS 4 INDEX: FIB4 SCORE: 0.75

## 2023-12-22 NOTE — PROGRESS NOTES
Subjective:     CC:   Chief Complaint   Patient presents with    Annual Exam       HPI: Established patient  Juwan Velez is a 53 y.o. male who presents for annual exam  1. Annual physical exam    Last Colorectal Cancer Screenin2023 recall 5 years   Last Tdap:   Received HPV series: Aged out  Hx STDs: No      2. Class 1 obesity due to excess calories without serious comorbidity with body mass index (BMI) of 30.0 to 30.9 in adult  BMI 31.5, counseled on importance of weight loss if he loses 15% to 10% of his weight that is going to improve his sleep apnea and cholesterol control    3. Other hyperlipidemia    Chronic continues to take statins denies side effects      Exercise: moderate regular exercise, aerobic < 3 days a week  Diet: no specific dietary restriction       He  has a past medical history of Chickenpox, Hyperlipidemia, Kidney stone, Ringing in ears, Snoring, Tonsillitis, and Wears glasses.  He  has a past surgical history that includes inguinal hernia repair robotic (Right, 2016); umbilical hernia repair (N/A, 2016); and tonsillectomy.    Family History   Problem Relation Age of Onset    Cancer Mother         breast ca    Breast Cancer Mother         Both breasts removed    Hypertension Mother     Hypertension Brother      Social History     Tobacco Use    Smoking status: Never    Smokeless tobacco: Never   Vaping Use    Vaping Use: Never used   Substance Use Topics    Alcohol use: No    Drug use: No     He  reports being sexually active and has had partner(s) who are female. He reports using the following method of birth control/protection: I.U.D..    Patient Active Problem List    Diagnosis Date Noted    ISSAC on CPAP 2023    Other hyperlipidemia 2023    Encounter to establish care with new doctor 2022    Class 1 obesity due to excess calories without serious comorbidity with body mass index (BMI) of 30.0 to 30.9 in adult 2022    Multiple lipomas  "12/22/2022    Hernia, inguinal 08/02/2016    Hernia, umbilical, with obstruction 08/02/2016     Current Outpatient Medications   Medication Sig Dispense Refill    atorvastatin (LIPITOR) 20 MG Tab Take 1 Tablet by mouth every evening. 90 Tablet 3     No current facility-administered medications for this visit.     No Known Allergies    Review of Systems   Constitutional: Negative for fever, chills and malaise/fatigue.   HENT: Negative for congestion.    Eyes: Negative for pain.   Respiratory: Negative for cough and shortness of breath.    Cardiovascular: Negative for chest pain and leg swelling.   Gastrointestinal: Negative for nausea, vomiting, abdominal pain and diarrhea.   Genitourinary: Negative for dysuria and hematuria.   Skin: Negative for rash.   Neurological: Negative for dizziness, focal weakness and headaches.   Endo/Heme/Allergies: Does not bruise/bleed easily.   Psychiatric/Behavioral: Negative for depression.  The patient is not nervous/anxious.      Objective:   /62 (BP Location: Right arm, Patient Position: Sitting, BP Cuff Size: Adult)   Pulse 70   Temp 36.6 °C (97.9 °F) (Temporal)   Resp 16   Ht 1.778 m (5' 10\")   Wt 99.8 kg (220 lb)   SpO2 96%   BMI 31.57 kg/m²      Wt Readings from Last 4 Encounters:   12/22/23 99.8 kg (220 lb)   06/20/23 97.1 kg (214 lb)   05/09/23 98.4 kg (217 lb)   12/22/22 97.1 kg (214 lb)           Physical Exam:  Constitutional: Well-developed and well-nourished. Not diaphoretic. No distress.   Skin: Skin is warm and dry. No rash noted.  Head: Atraumatic without lesions.  Eyes: Conjunctivae and extraocular motions are normal. Pupils are equal, round, and reactive to light. No scleral icterus.   Ears:  External ears unremarkable. Tympanic membranes clear and intact.  Nose: Nares patent. Septum midline. Turbinates without erythema nor edema. No discharge.   Mouth/Throat: Tongue normal. Oropharynx is clear and moist. Posterior pharynx without erythema or " exudates.  Neck: Supple, trachea midline. Normal range of motion. No thyromegaly present. No lymphadenopathy--cervical or supraclavicular.  Cardiovascular: Regular rate and rhythm, S1 and S2 without murmur, rubs, or gallops.    Respiratory: Effort normal. Clear to auscultation throughout. No adventitious sounds.   Abdomen: Soft, non tender, and without distention. Active bowel sounds in all four quadrants. No rebound, guarding, masses or HSM.    Extremities: No cyanosis, clubbing, erythema, nor edema. Distal pulses intact and symmetric.   Musculoskeletal: All major joints AROM full in all directions without pain.  Neurological: Alert and oriented x 3. Grossly non-focal. Strength and sensation grossly intact. DTRs 2+/3 and symmetric.   Psychiatric:  Behavior, mood, and affect are appropriate.      Assessment and Plan:     1. Annual physical exam  Reviewed health maintenance and counseled as below  2. Class 1 obesity due to excess calories without serious comorbidity with body mass index (BMI) of 30.0 to 30.9 in adult  Counseled for weight loss, advised to do labs as directed.  Healthy lifestyle discussed and diet restrictions.  - Comp Metabolic Panel; Future  - TSH WITH REFLEX TO FT4; Future  - Lipid Profile; Future  - HEMOGLOBIN A1C; Future    3. Other hyperlipidemia  - Comp Metabolic Panel; Future  - Lipid Profile; Future    4. Screening for deficiency anemia  - CBC WITH DIFFERENTIAL; Future    5. Thyroid disorder screen  - TSH WITH REFLEX TO FT4; Future      Health maintenance:     Labs per orders  Immunizations per orders  Patient counseled about skin care, diet, supplements, and exercise.  Discussed diet and exercise, and weight management    Follow-up: No follow-ups on file.

## 2024-06-06 DIAGNOSIS — E78.49 OTHER HYPERLIPIDEMIA: ICD-10-CM

## 2024-06-07 RX ORDER — ATORVASTATIN CALCIUM 20 MG/1
20 TABLET, FILM COATED ORAL EVERY EVENING
Qty: 90 TABLET | Refills: 3 | Status: SHIPPED | OUTPATIENT
Start: 2024-06-07

## 2024-06-07 NOTE — TELEPHONE ENCOUNTER
Received request via: Pharmacy    Was the patient seen in the last year in this department? Yes12/22/23    Does the patient have an active prescription (recently filled or refills available) for medication(s) requested? No    Ph[facility]    Does the patient have retirement Plus and need 100 day supply (blood pressure, diabetes and cholesterol meds only)? Patient does not have SCP

## 2024-06-25 ENCOUNTER — APPOINTMENT (RX ONLY)
Dept: URBAN - METROPOLITAN AREA CLINIC 6 | Facility: CLINIC | Age: 54
Setting detail: DERMATOLOGY
End: 2024-06-25

## 2024-06-25 DIAGNOSIS — L82.1 OTHER SEBORRHEIC KERATOSIS: ICD-10-CM

## 2024-06-25 DIAGNOSIS — Z71.89 OTHER SPECIFIED COUNSELING: ICD-10-CM

## 2024-06-25 DIAGNOSIS — L73.8 OTHER SPECIFIED FOLLICULAR DISORDERS: ICD-10-CM

## 2024-06-25 DIAGNOSIS — D22 MELANOCYTIC NEVI: ICD-10-CM

## 2024-06-25 DIAGNOSIS — D18.0 HEMANGIOMA: ICD-10-CM

## 2024-06-25 DIAGNOSIS — L81.4 OTHER MELANIN HYPERPIGMENTATION: ICD-10-CM

## 2024-06-25 DIAGNOSIS — L82.0 INFLAMED SEBORRHEIC KERATOSIS: ICD-10-CM

## 2024-06-25 PROBLEM — D22.72 MELANOCYTIC NEVI OF LEFT LOWER LIMB, INCLUDING HIP: Status: ACTIVE | Noted: 2024-06-25

## 2024-06-25 PROBLEM — D22.71 MELANOCYTIC NEVI OF RIGHT LOWER LIMB, INCLUDING HIP: Status: ACTIVE | Noted: 2024-06-25

## 2024-06-25 PROBLEM — D22.61 MELANOCYTIC NEVI OF RIGHT UPPER LIMB, INCLUDING SHOULDER: Status: ACTIVE | Noted: 2024-06-25

## 2024-06-25 PROBLEM — D22.5 MELANOCYTIC NEVI OF TRUNK: Status: ACTIVE | Noted: 2024-06-25

## 2024-06-25 PROBLEM — D22.62 MELANOCYTIC NEVI OF LEFT UPPER LIMB, INCLUDING SHOULDER: Status: ACTIVE | Noted: 2024-06-25

## 2024-06-25 PROBLEM — D18.01 HEMANGIOMA OF SKIN AND SUBCUTANEOUS TISSUE: Status: ACTIVE | Noted: 2024-06-25

## 2024-06-25 PROCEDURE — ? LIQUID NITROGEN

## 2024-06-25 PROCEDURE — ? COUNSELING

## 2024-06-25 PROCEDURE — 99203 OFFICE O/P NEW LOW 30 MIN: CPT | Mod: 25

## 2024-06-25 PROCEDURE — 17110 DESTRUCTION B9 LES UP TO 14: CPT

## 2024-06-25 ASSESSMENT — LOCATION DETAILED DESCRIPTION DERM
LOCATION DETAILED: LEFT INFERIOR UPPER BACK
LOCATION DETAILED: RIGHT ANTERIOR DISTAL UPPER ARM
LOCATION DETAILED: RIGHT MEDIAL INFERIOR CHEST
LOCATION DETAILED: LEFT SUPERIOR FRONTAL SCALP
LOCATION DETAILED: LEFT CENTRAL MALAR CHEEK
LOCATION DETAILED: RIGHT SUPERIOR CENTRAL MALAR CHEEK
LOCATION DETAILED: LEFT ANTERIOR PROXIMAL THIGH
LOCATION DETAILED: LEFT ANTERIOR DISTAL UPPER ARM
LOCATION DETAILED: RIGHT PROXIMAL CALF
LOCATION DETAILED: RIGHT VENTRAL DISTAL FOREARM
LOCATION DETAILED: LEFT MEDIAL MALAR CHEEK
LOCATION DETAILED: RIGHT DISTAL POSTERIOR THIGH
LOCATION DETAILED: RIGHT VENTRAL PROXIMAL FOREARM
LOCATION DETAILED: LEFT PROXIMAL CALF
LOCATION DETAILED: RIGHT MID-UPPER BACK
LOCATION DETAILED: LEFT DISTAL POSTERIOR THIGH
LOCATION DETAILED: LEFT PROXIMAL DORSAL FOREARM
LOCATION DETAILED: LEFT VENTRAL DISTAL FOREARM
LOCATION DETAILED: RIGHT SUPERIOR LATERAL MIDBACK
LOCATION DETAILED: RIGHT ANTERIOR PROXIMAL THIGH
LOCATION DETAILED: RIGHT PROXIMAL DORSAL FOREARM

## 2024-06-25 ASSESSMENT — LOCATION SIMPLE DESCRIPTION DERM
LOCATION SIMPLE: SCALP
LOCATION SIMPLE: RIGHT CHEEK
LOCATION SIMPLE: RIGHT FOREARM
LOCATION SIMPLE: RIGHT UPPER BACK
LOCATION SIMPLE: RIGHT UPPER ARM
LOCATION SIMPLE: RIGHT POSTERIOR THIGH
LOCATION SIMPLE: LEFT FOREARM
LOCATION SIMPLE: RIGHT THIGH
LOCATION SIMPLE: LEFT UPPER BACK
LOCATION SIMPLE: CHEST
LOCATION SIMPLE: RIGHT CALF
LOCATION SIMPLE: LEFT THIGH
LOCATION SIMPLE: LEFT POSTERIOR THIGH
LOCATION SIMPLE: LEFT CHEEK
LOCATION SIMPLE: RIGHT LOWER BACK
LOCATION SIMPLE: LEFT CALF
LOCATION SIMPLE: LEFT UPPER ARM

## 2024-06-25 ASSESSMENT — LOCATION ZONE DERM
LOCATION ZONE: SCALP
LOCATION ZONE: LEG
LOCATION ZONE: TRUNK
LOCATION ZONE: FACE
LOCATION ZONE: ARM

## 2024-06-25 NOTE — PROCEDURE: LIQUID NITROGEN
Show Applicator Variable?: Yes
Spray Paint Text: The liquid nitrogen was applied to the skin utilizing a spray paint frosting technique.
Detail Level: Detailed
Render Note In Bullet Format When Appropriate: No
Post-Care Instructions: I reviewed with the patient in detail post-care instructions. Patient is to wear sunprotection, and avoid picking at any of the treated lesions. Pt may apply Vaseline to crusted or scabbing areas.
Medical Necessity Information: It is in your best interest to select a reason for this procedure from the list below. All of these items fulfill various CMS LCD requirements except the new and changing color options.
Number Of Freeze-Thaw Cycles: 3 freeze-thaw cycles
Medical Necessity Clause: This procedure was medically necessary because the lesions that were treated were:
Consent: The patient's consent was obtained including but not limited to risks of crusting, scabbing, blistering, scarring, darker or lighter pigmentary change, recurrence, incomplete removal and infection.

## 2024-07-01 ENCOUNTER — TELEPHONE (OUTPATIENT)
Dept: HEALTH INFORMATION MANAGEMENT | Facility: OTHER | Age: 54
End: 2024-07-01
Payer: COMMERCIAL

## 2024-07-26 ENCOUNTER — DOCUMENTATION (OUTPATIENT)
Dept: HEALTH INFORMATION MANAGEMENT | Facility: OTHER | Age: 54
End: 2024-07-26
Payer: COMMERCIAL

## 2025-03-25 ENCOUNTER — TELEPHONE (OUTPATIENT)
Dept: MEDICAL GROUP | Facility: IMAGING CENTER | Age: 55
End: 2025-03-25
Payer: COMMERCIAL

## 2025-03-25 NOTE — TELEPHONE ENCOUNTER
[facility] to reschedule a patient's appointment on 04/29/2025 with .  will be out-of-office at that time. Patient was instructed to call [phone] or use [employer] application to reschedule at their earliest convenience.

## 2025-04-03 ENCOUNTER — TELEPHONE (OUTPATIENT)
Dept: MEDICAL GROUP | Facility: IMAGING CENTER | Age: 55
End: 2025-04-03
Payer: COMMERCIAL

## 2025-04-03 NOTE — TELEPHONE ENCOUNTER
[facility] to reschedule a patient's appointment on 04/29/2025 with .  will be out-of-office at that time. Patient was instructed to call [phone] or use the [employer] application to reschedule at their earliest convenience.

## 2025-04-07 ENCOUNTER — TELEPHONE (OUTPATIENT)
Dept: MEDICAL GROUP | Facility: IMAGING CENTER | Age: 55
End: 2025-04-07
Payer: COMMERCIAL

## 2025-04-07 NOTE — TELEPHONE ENCOUNTER
[facility] to reschedule a patient's appointment on 04/29/2025 with .  will be out-of-office at that time. Patient was instructed to call [phone] or use the [employer] application to reschedule at their earliest convenience.

## 2025-05-23 ENCOUNTER — APPOINTMENT (OUTPATIENT)
Dept: MEDICAL GROUP | Facility: IMAGING CENTER | Age: 55
End: 2025-05-23
Payer: COMMERCIAL

## 2025-05-23 VITALS
OXYGEN SATURATION: 93 % | DIASTOLIC BLOOD PRESSURE: 60 MMHG | TEMPERATURE: 98.3 F | BODY MASS INDEX: 31.92 KG/M2 | HEIGHT: 70 IN | HEART RATE: 72 BPM | WEIGHT: 223 LBS | SYSTOLIC BLOOD PRESSURE: 102 MMHG

## 2025-05-23 DIAGNOSIS — G47.33 OSA ON CPAP: ICD-10-CM

## 2025-05-23 DIAGNOSIS — R79.89 ABNORMAL CBC: ICD-10-CM

## 2025-05-23 DIAGNOSIS — Z13.1 DIABETES MELLITUS SCREENING: ICD-10-CM

## 2025-05-23 DIAGNOSIS — Z76.89 ENCOUNTER TO ESTABLISH CARE: Primary | ICD-10-CM

## 2025-05-23 DIAGNOSIS — Z11.4 ENCOUNTER FOR SCREENING FOR HIV: ICD-10-CM

## 2025-05-23 DIAGNOSIS — E66.09 CLASS 1 OBESITY DUE TO EXCESS CALORIES WITHOUT SERIOUS COMORBIDITY WITH BODY MASS INDEX (BMI) OF 32.0 TO 32.9 IN ADULT: ICD-10-CM

## 2025-05-23 DIAGNOSIS — Z11.59 NEED FOR HEPATITIS C SCREENING TEST: ICD-10-CM

## 2025-05-23 DIAGNOSIS — E66.811 CLASS 1 OBESITY DUE TO EXCESS CALORIES WITHOUT SERIOUS COMORBIDITY WITH BODY MASS INDEX (BMI) OF 32.0 TO 32.9 IN ADULT: ICD-10-CM

## 2025-05-23 DIAGNOSIS — Z23 NEED FOR VACCINATION: ICD-10-CM

## 2025-05-23 DIAGNOSIS — E55.9 VITAMIN D INSUFFICIENCY: ICD-10-CM

## 2025-05-23 DIAGNOSIS — Z12.5 PROSTATE CANCER SCREENING: ICD-10-CM

## 2025-05-23 DIAGNOSIS — E78.49 OTHER HYPERLIPIDEMIA: ICD-10-CM

## 2025-05-23 DIAGNOSIS — Z11.59 NEED FOR HEPATITIS B SCREENING TEST: ICD-10-CM

## 2025-05-23 DIAGNOSIS — R20.2 NUMBNESS AND TINGLING OF RIGHT LOWER EXTREMITY: ICD-10-CM

## 2025-05-23 DIAGNOSIS — R20.0 NUMBNESS AND TINGLING OF RIGHT LOWER EXTREMITY: ICD-10-CM

## 2025-05-23 RX ORDER — ATORVASTATIN CALCIUM 20 MG/1
20 TABLET, FILM COATED ORAL EVERY EVENING
Qty: 90 TABLET | Refills: 3 | Status: SHIPPED | OUTPATIENT
Start: 2025-05-23

## 2025-05-23 ASSESSMENT — PAIN SCALES - GENERAL: PAINLEVEL_OUTOF10: NO PAIN

## 2025-05-23 ASSESSMENT — PATIENT HEALTH QUESTIONNAIRE - PHQ9: CLINICAL INTERPRETATION OF PHQ2 SCORE: 0

## 2025-05-23 NOTE — PROGRESS NOTES
New Patient to Establish    Reason to establish: New patient to establish    Juwan Velez is a 55 y.o. male who presents today with the following:    CC:   Chief Complaint   Patient presents with    Establish Care     Prior PCP Dr Hough     Numbness     Periodic numbness on R thigh since 8/24 x 3-4 times a day       HPI:     Verbal consent was acquired by the patient to use [employer] ambient listening note generation during this visit Yes       History of Present Illness  The patient presents for evaluation of right thigh numbness, hypercholesterolemia, sleep apnea, weight gain, and health maintenance.    Right Thigh Numbness  He reports transient numbness in the anterior right thigh, localized to the L3 dermatome, occurring during standing or walking since 08/2024 or 09/2024. Symptoms resolve with shaking or stretching and do not occur while sitting. He denies back pain, rashes, or excessive hair growth. He has a history of left-sided sciatic nerve issues, previously attributed to sitting on his wallet, now resolved after switching to a front-pocket wallet. He estimates 10,000-12,000 daily steps and carries his phone in his pocket while walking.  - Onset: Since 08/2024 or 09/2024.  - Location: Anterior right thigh, localized to the L3 dermatome.  - Duration: Transient, occurring during standing or walking.  - Character: Numbness.  - Alleviating/Aggravating Factors: Resolves with shaking or stretching; does not occur while sitting.  - Severity: Not specified.    Hypercholesterolemia  He has hypercholesterolemia managed with atorvastatin 20 mg daily, with no reported side effects. He occasionally forgets doses but generally adheres to his regimen. Both parents have high cholesterol.  - Alleviating/Aggravating Factors: Managed with atorvastatin 20 mg daily; occasionally forgets doses.  - Severity: No reported side effects.    Weight Gain  He reports gradual weight gain, increasing from 200-210 pounds over  several years, with a BMI of 32. He maintains an active lifestyle with walking, hiking, and backpacking, denies overeating, and limits sugary foods. He does not consume alcohol and prefers corn chips. He is interested in pharmaceutical options for weight loss.  - Onset: Gradual over several years.  - Duration: Several years.  - Character: Weight gain from 200-210 pounds, BMI of 32.  - Alleviating/Aggravating Factors: Active lifestyle with walking, hiking, and backpacking; denies overeating; limits sugary foods; does not consume alcohol; prefers corn chips.  - Severity: BMI of 32.    Sleep Apnea  He uses a CPAP machine for sleep apnea, reporting effectiveness with an average of 1.8 events per night, spiking to over 3 events on Monday. His initial sleep study showed 25 events per hour. He occasionally removes the mask during sleep and needs to replace hoses and components. His wife has observed breath-holding episodes during sleep.  - Onset: Not specified.  - Duration: Not specified.  - Character: Sleep apnea with an average of 1.8 events per night, spiking to over 3 events on Monday; initial sleep study showed 25 events per hour.  - Alleviating/Aggravating Factors: Uses CPAP machine; occasionally removes the mask during sleep; needs to replace hoses and components.  - Severity: Effective CPAP use with an average of 1.8 events per night.    Health Maintenance  He is due for vaccinations. A 2023 colonoscopy revealed a polyp, with follow-up recommended in 2028. He sees a dermatologist annually for skin checks and has had facial growths removed. He has undergone a heart scan for calcium plaque and plans to check prostate health with blood work.    SOCIAL HISTORY  - Does not smoke  - Does not drink alcohol    FAMILY HISTORY  - Parents have high cholesterol    No problems updated.      Current Medications[1]    Allergies, past medical history, past surgical history, medications, family history, social history reviewed and  "updated.    ROS Please see HPI    Physical Exam  /60 (BP Location: Left arm, Patient Position: Sitting, BP Cuff Size: Adult)   Pulse 72   Temp 36.8 °C (98.3 °F) (Temporal)   Ht 1.778 m (5' 10\")   Wt 101 kg (223 lb)   SpO2 93%   BMI 32.00 kg/m²   Physical Exam  Constitutional:       General: He is not in acute distress.     Appearance: Normal appearance.   HENT:      Head: Normocephalic and atraumatic.      Nose: Nose normal.      Mouth/Throat:      Pharynx: Oropharynx is clear.   Eyes:      Extraocular Movements: Extraocular movements intact.      Conjunctiva/sclera: Conjunctivae normal.   Cardiovascular:      Rate and Rhythm: Normal rate and regular rhythm.   Pulmonary:      Effort: Pulmonary effort is normal.      Breath sounds: Normal breath sounds.   Abdominal:      General: Bowel sounds are normal.      Palpations: Abdomen is soft.      Tenderness: There is no abdominal tenderness.   Musculoskeletal:      Right lower leg: No edema.      Left lower leg: No edema.   Neurological:      Mental Status: He is alert. Mental status is at baseline.   Psychiatric:         Mood and Affect: Mood normal.         Behavior: Behavior normal.         Thought Content: Thought content normal.         Judgment: Judgment normal.         Assessment and Plan      Assessment & Plan          1. Encounter to establish care    2. Class 1 obesity due to excess calories without serious comorbidity with body mass index (BMI) of 32.0 to 32.9 in adult  Body mass index is 32 kg/m².    - Referral to Nutrition Services  Healthful lifestyle measures  Consider pharmacotherapy    3. Other hyperlipidemia  - Lipid Profile; Future  - TSH WITH REFLEX TO FT4; Future  - Referral to Nutrition Services  - atorvastatin (LIPITOR) 20 MG Tab; Take 1 Tablet by mouth every evening.  Dispense: 90 Tablet; Refill: 3  - Continue atorvastatin 20 mg daily; refill provided.  - Referral to nutritionist for dietary modifications.    4. Numbness and tingling " of right lower extremity  Right anterior thigh numbness  - VITAMIN B12; Future  - VITAMIN B1; Future  - VITAMIN B6; Future  - HEMOGLOBIN A1C; Future  - Referral to Neurology  Possible nerve entrapment, possibly exacerbated by weight gain and prolonged leaning.  - Advised to relocate phone and items from pocket to reduce nerve irritation.  - reduce prolong leaning on thigh  - Basic labs to rule out diabetes; B12 and B6 levels to be checked.  - Neurology referral if symptoms persist.    5. ISSAC on CPAP  Continue CPAP  Encourage to increase use to 6 hours and more    6. Abnormal CBC  - CBC WITH DIFFERENTIAL; Future  - URINALYSIS,CULTURE IF INDICATED; Future    7. Encounter for screening for HIV  - HIV AG/AB COMBO ASSAY SCREENING; Future    8. Need for hepatitis C screening test  - HEP C VIRUS ANTIBODY; Future    9. Need for hepatitis B screening test  - HEP B SURFACE ANTIGEN; Future  - HEP B SURFACE AB; Future    10. Vitamin D insufficiency  - VITAMIN D,25 HYDROXY (DEFICIENCY); Future  Recommend vitamin D3 3850-3546 international units daily    11. Diabetes mellitus screening  - Comp Metabolic Panel; Future    12. Prostate cancer screening  - PROSTATE SPECIFIC AG SCREENING; Future    13. Need for vaccination  - Pneumococcal Conjugate Vaccine 20-Valent (6 wks+)    Follow with derm for skin cancer screening    Follow-up:Return in about 1 month (around 6/23/2025), or if symptoms worsen or fail to improve.    This note was created using voice recognition software. There may be unintended errors in spelling, grammar or content.       [1]   Current Outpatient Medications:     atorvastatin (LIPITOR) 20 MG Tab, Take 1 Tablet by mouth every evening., Disp: 90 Tablet, Rfl: 3

## 2025-05-27 NOTE — Clinical Note
REFERRAL APPROVAL NOTICE         Sent on May 27, 2025                   Juwan Verduzco Rosie  [address]  [place] [address]                   Dear Mr. Velez,    After a careful review of the medical information and benefit coverage, [facility] has processed your referral. See below for additional details.    If applicable, you must be actively enrolled with your insurance for coverage of the authorized service. If you have any questions regarding your coverage, please contact your insurance directly.    REFERRAL INFORMATION   Referral #:  [id number]  Referred-To Department    Referred-By Provider:  Latoya Yu M.D.   [facility] [address] [place] [address]  [phone] [address] [facility]  [place] [id number] [phone]    Referral Start Date:  05/23/2025  Referral End Date:   05/23/2026             SCHEDULING  If you do not already have an appointment, please call [phone] to make an appointment.     MORE INFORMATION  If you do not already have a [employer] account, sign up at: [email or web address]  You can access your medical information, make appointments, see lab results, billing information, and more.  If you have questions regarding this referral, please contact  the [facility] Referrals department at:             [phone]. Monday - Friday 8:00AM - 5:00PM.     Sincerely,    [facility]

## 2025-06-17 ENCOUNTER — HOSPITAL ENCOUNTER (OUTPATIENT)
Dept: LAB | Facility: MEDICAL CENTER | Age: 55
End: 2025-06-17
Attending: INTERNAL MEDICINE
Payer: COMMERCIAL

## 2025-06-17 DIAGNOSIS — Z12.5 PROSTATE CANCER SCREENING: ICD-10-CM

## 2025-06-17 DIAGNOSIS — E55.9 VITAMIN D INSUFFICIENCY: ICD-10-CM

## 2025-06-17 DIAGNOSIS — Z11.59 NEED FOR HEPATITIS C SCREENING TEST: ICD-10-CM

## 2025-06-17 DIAGNOSIS — R20.2 NUMBNESS AND TINGLING OF RIGHT LOWER EXTREMITY: ICD-10-CM

## 2025-06-17 DIAGNOSIS — Z13.1 DIABETES MELLITUS SCREENING: ICD-10-CM

## 2025-06-17 DIAGNOSIS — R79.89 ABNORMAL CBC: ICD-10-CM

## 2025-06-17 DIAGNOSIS — R20.0 NUMBNESS AND TINGLING OF RIGHT LOWER EXTREMITY: ICD-10-CM

## 2025-06-17 DIAGNOSIS — Z11.59 NEED FOR HEPATITIS B SCREENING TEST: ICD-10-CM

## 2025-06-17 DIAGNOSIS — Z11.4 ENCOUNTER FOR SCREENING FOR HIV: ICD-10-CM

## 2025-06-17 DIAGNOSIS — E78.49 OTHER HYPERLIPIDEMIA: ICD-10-CM

## 2025-06-17 LAB
25(OH)D3 SERPL-MCNC: 26 NG/ML (ref 30–100)
ALBUMIN SERPL BCP-MCNC: 4.1 G/DL (ref 3.2–4.9)
ALBUMIN/GLOB SERPL: 1.6 G/DL
ALP SERPL-CCNC: 83 U/L (ref 30–99)
ALT SERPL-CCNC: 20 U/L (ref 2–50)
ANION GAP SERPL CALC-SCNC: 7 MMOL/L (ref 7–16)
APPEARANCE UR: CLEAR
AST SERPL-CCNC: 21 U/L (ref 12–45)
BASOPHILS # BLD AUTO: 0.9 % (ref 0–1.8)
BASOPHILS # BLD: 0.05 K/UL (ref 0–0.12)
BILIRUB SERPL-MCNC: 0.4 MG/DL (ref 0.1–1.5)
BILIRUB UR QL STRIP.AUTO: NEGATIVE
BUN SERPL-MCNC: 17 MG/DL (ref 8–22)
CALCIUM ALBUM COR SERPL-MCNC: 8.9 MG/DL (ref 8.5–10.5)
CALCIUM SERPL-MCNC: 9 MG/DL (ref 8.5–10.5)
CHLORIDE SERPL-SCNC: 106 MMOL/L (ref 96–112)
CHOLEST SERPL-MCNC: 168 MG/DL (ref 100–199)
CO2 SERPL-SCNC: 27 MMOL/L (ref 20–33)
COLOR UR: YELLOW
CREAT SERPL-MCNC: 1.08 MG/DL (ref 0.5–1.4)
EOSINOPHIL # BLD AUTO: 0.56 K/UL (ref 0–0.51)
EOSINOPHIL NFR BLD: 10.2 % (ref 0–6.9)
ERYTHROCYTE [DISTWIDTH] IN BLOOD BY AUTOMATED COUNT: 42.4 FL (ref 35.9–50)
EST. AVERAGE GLUCOSE BLD GHB EST-MCNC: 111 MG/DL
FASTING STATUS PATIENT QL REPORTED: NORMAL
GFR SERPLBLD CREATININE-BSD FMLA CKD-EPI: 82 ML/MIN/1.73 M 2
GLOBULIN SER CALC-MCNC: 2.5 G/DL (ref 1.9–3.5)
GLUCOSE SERPL-MCNC: 101 MG/DL (ref 65–99)
GLUCOSE UR STRIP.AUTO-MCNC: NEGATIVE MG/DL
HBA1C MFR BLD: 5.5 % (ref 4–5.6)
HBV SURFACE AB SERPL IA-ACNC: <3.5 MIU/ML (ref 0–10)
HBV SURFACE AG SER QL: NORMAL
HCT VFR BLD AUTO: 47.6 % (ref 42–52)
HCV AB SER QL: NORMAL
HDLC SERPL-MCNC: 39 MG/DL
HGB BLD-MCNC: 16.1 G/DL (ref 14–18)
HIV 1+2 AB+HIV1 P24 AG SERPL QL IA: NORMAL
IMM GRANULOCYTES # BLD AUTO: 0.01 K/UL (ref 0–0.11)
IMM GRANULOCYTES NFR BLD AUTO: 0.2 % (ref 0–0.9)
KETONES UR STRIP.AUTO-MCNC: NEGATIVE MG/DL
LDLC SERPL CALC-MCNC: 92 MG/DL
LEUKOCYTE ESTERASE UR QL STRIP.AUTO: NEGATIVE
LYMPHOCYTES # BLD AUTO: 1.41 K/UL (ref 1–4.8)
LYMPHOCYTES NFR BLD: 25.6 % (ref 22–41)
MCH RBC QN AUTO: 31.4 PG (ref 27–33)
MCHC RBC AUTO-ENTMCNC: 33.8 G/DL (ref 32.3–36.5)
MCV RBC AUTO: 93 FL (ref 81.4–97.8)
MICRO URNS: NORMAL
MONOCYTES # BLD AUTO: 0.54 K/UL (ref 0–0.85)
MONOCYTES NFR BLD AUTO: 9.8 % (ref 0–13.4)
NEUTROPHILS # BLD AUTO: 2.94 K/UL (ref 1.82–7.42)
NEUTROPHILS NFR BLD: 53.3 % (ref 44–72)
NITRITE UR QL STRIP.AUTO: NEGATIVE
NRBC # BLD AUTO: 0 K/UL
NRBC BLD-RTO: 0 /100 WBC (ref 0–0.2)
PH UR STRIP.AUTO: 5.5 [PH] (ref 5–8)
PLATELET # BLD AUTO: 221 K/UL (ref 164–446)
PMV BLD AUTO: 10.5 FL (ref 9–12.9)
POTASSIUM SERPL-SCNC: 4.3 MMOL/L (ref 3.6–5.5)
PROT SERPL-MCNC: 6.6 G/DL (ref 6–8.2)
PROT UR QL STRIP: NEGATIVE MG/DL
PSA SERPL DL<=0.01 NG/ML-MCNC: 0.55 NG/ML (ref 0–4)
RBC # BLD AUTO: 5.12 M/UL (ref 4.7–6.1)
RBC UR QL AUTO: NEGATIVE
SODIUM SERPL-SCNC: 140 MMOL/L (ref 135–145)
SP GR UR STRIP.AUTO: 1.02
TRIGL SERPL-MCNC: 185 MG/DL (ref 0–149)
TSH SERPL DL<=0.005 MIU/L-ACNC: 2.53 UIU/ML (ref 0.38–5.33)
UROBILINOGEN UR STRIP.AUTO-MCNC: 0.2 EU/DL
VIT B12 SERPL-MCNC: 484 PG/ML (ref 211–911)
WBC # BLD AUTO: 5.5 K/UL (ref 4.8–10.8)

## 2025-06-17 PROCEDURE — 82607 VITAMIN B-12: CPT

## 2025-06-17 PROCEDURE — 84153 ASSAY OF PSA TOTAL: CPT

## 2025-06-17 PROCEDURE — 87340 HEPATITIS B SURFACE AG IA: CPT

## 2025-06-17 PROCEDURE — 83036 HEMOGLOBIN GLYCOSYLATED A1C: CPT

## 2025-06-17 PROCEDURE — 86706 HEP B SURFACE ANTIBODY: CPT

## 2025-06-17 PROCEDURE — 36415 COLL VENOUS BLD VENIPUNCTURE: CPT

## 2025-06-17 PROCEDURE — 80061 LIPID PANEL: CPT

## 2025-06-17 PROCEDURE — 81003 URINALYSIS AUTO W/O SCOPE: CPT

## 2025-06-17 PROCEDURE — 82306 VITAMIN D 25 HYDROXY: CPT

## 2025-06-17 PROCEDURE — 80053 COMPREHEN METABOLIC PANEL: CPT

## 2025-06-17 PROCEDURE — 84443 ASSAY THYROID STIM HORMONE: CPT

## 2025-06-17 PROCEDURE — 84425 ASSAY OF VITAMIN B-1: CPT

## 2025-06-17 PROCEDURE — 86803 HEPATITIS C AB TEST: CPT

## 2025-06-17 PROCEDURE — 85025 COMPLETE CBC W/AUTO DIFF WBC: CPT

## 2025-06-17 PROCEDURE — 87389 HIV-1 AG W/HIV-1&-2 AB AG IA: CPT

## 2025-06-17 PROCEDURE — 84207 ASSAY OF VITAMIN B-6: CPT

## 2025-06-20 LAB — VIT B6 SERPL-MCNC: 148.3 NMOL/L (ref 20–125)

## 2025-06-21 LAB — VIT B1 BLD-MCNC: 198 NMOL/L (ref 70–180)

## 2025-06-23 ENCOUNTER — RESULTS FOLLOW-UP (OUTPATIENT)
Dept: MEDICAL GROUP | Facility: IMAGING CENTER | Age: 55
End: 2025-06-23
Payer: COMMERCIAL

## 2025-06-25 ENCOUNTER — APPOINTMENT (OUTPATIENT)
Dept: URBAN - METROPOLITAN AREA CLINIC 6 | Facility: CLINIC | Age: 55
Setting detail: DERMATOLOGY
End: 2025-06-25

## 2025-06-25 DIAGNOSIS — L82.0 INFLAMED SEBORRHEIC KERATOSIS: ICD-10-CM

## 2025-06-25 DIAGNOSIS — L82.1 OTHER SEBORRHEIC KERATOSIS: ICD-10-CM

## 2025-06-25 DIAGNOSIS — Z71.89 OTHER SPECIFIED COUNSELING: ICD-10-CM

## 2025-06-25 DIAGNOSIS — D18.0 HEMANGIOMA: ICD-10-CM

## 2025-06-25 DIAGNOSIS — L73.8 OTHER SPECIFIED FOLLICULAR DISORDERS: ICD-10-CM

## 2025-06-25 DIAGNOSIS — L81.4 OTHER MELANIN HYPERPIGMENTATION: ICD-10-CM

## 2025-06-25 DIAGNOSIS — D22 MELANOCYTIC NEVI: ICD-10-CM

## 2025-06-25 PROBLEM — D22.71 MELANOCYTIC NEVI OF RIGHT LOWER LIMB, INCLUDING HIP: Status: ACTIVE | Noted: 2025-06-25

## 2025-06-25 PROBLEM — D22.61 MELANOCYTIC NEVI OF RIGHT UPPER LIMB, INCLUDING SHOULDER: Status: ACTIVE | Noted: 2025-06-25

## 2025-06-25 PROBLEM — D22.62 MELANOCYTIC NEVI OF LEFT UPPER LIMB, INCLUDING SHOULDER: Status: ACTIVE | Noted: 2025-06-25

## 2025-06-25 PROBLEM — D18.01 HEMANGIOMA OF SKIN AND SUBCUTANEOUS TISSUE: Status: ACTIVE | Noted: 2025-06-25

## 2025-06-25 PROBLEM — D22.72 MELANOCYTIC NEVI OF LEFT LOWER LIMB, INCLUDING HIP: Status: ACTIVE | Noted: 2025-06-25

## 2025-06-25 PROBLEM — D22.5 MELANOCYTIC NEVI OF TRUNK: Status: ACTIVE | Noted: 2025-06-25

## 2025-06-25 PROCEDURE — ? ADDITIONAL NOTES

## 2025-06-25 PROCEDURE — ? LIQUID NITROGEN

## 2025-06-25 PROCEDURE — ? COUNSELING

## 2025-06-25 ASSESSMENT — LOCATION DETAILED DESCRIPTION DERM
LOCATION DETAILED: LEFT INFERIOR CENTRAL MALAR CHEEK
LOCATION DETAILED: RIGHT VENTRAL DISTAL FOREARM
LOCATION DETAILED: RIGHT ANTERIOR PROXIMAL THIGH
LOCATION DETAILED: RIGHT SUPERIOR MEDIAL MALAR CHEEK
LOCATION DETAILED: RIGHT ANTERIOR DISTAL UPPER ARM
LOCATION DETAILED: LEFT DISTAL POSTERIOR THIGH
LOCATION DETAILED: LEFT ANTERIOR PROXIMAL THIGH
LOCATION DETAILED: RIGHT SUPERIOR CENTRAL MALAR CHEEK
LOCATION DETAILED: LEFT PROXIMAL DORSAL FOREARM
LOCATION DETAILED: RIGHT PROXIMAL CALF
LOCATION DETAILED: RIGHT CENTRAL MALAR CHEEK
LOCATION DETAILED: RIGHT INFERIOR CENTRAL MALAR CHEEK
LOCATION DETAILED: LEFT VENTRAL DISTAL FOREARM
LOCATION DETAILED: RIGHT SUPERIOR MEDIAL FOREHEAD
LOCATION DETAILED: LEFT ANTERIOR DISTAL UPPER ARM
LOCATION DETAILED: LEFT INFERIOR UPPER BACK
LOCATION DETAILED: RIGHT SUPERIOR LATERAL MIDBACK
LOCATION DETAILED: LEFT CENTRAL MALAR CHEEK
LOCATION DETAILED: LEFT CENTRAL PARIETAL SCALP
LOCATION DETAILED: RIGHT PROXIMAL DORSAL FOREARM
LOCATION DETAILED: RIGHT DISTAL POSTERIOR THIGH
LOCATION DETAILED: LEFT PROXIMAL CALF
LOCATION DETAILED: LEFT MEDIAL MALAR CHEEK
LOCATION DETAILED: RIGHT MID-UPPER BACK
LOCATION DETAILED: RIGHT VENTRAL PROXIMAL FOREARM
LOCATION DETAILED: LEFT FOREHEAD
LOCATION DETAILED: RIGHT MEDIAL INFERIOR CHEST

## 2025-06-25 ASSESSMENT — LOCATION SIMPLE DESCRIPTION DERM
LOCATION SIMPLE: RIGHT POSTERIOR THIGH
LOCATION SIMPLE: RIGHT THIGH
LOCATION SIMPLE: CHEST
LOCATION SIMPLE: RIGHT FOREARM
LOCATION SIMPLE: RIGHT CHEEK
LOCATION SIMPLE: SCALP
LOCATION SIMPLE: LEFT FOREARM
LOCATION SIMPLE: RIGHT FOREHEAD
LOCATION SIMPLE: LEFT CALF
LOCATION SIMPLE: RIGHT LOWER BACK
LOCATION SIMPLE: LEFT FOREHEAD
LOCATION SIMPLE: LEFT POSTERIOR THIGH
LOCATION SIMPLE: LEFT THIGH
LOCATION SIMPLE: RIGHT CALF
LOCATION SIMPLE: LEFT UPPER BACK
LOCATION SIMPLE: RIGHT UPPER ARM
LOCATION SIMPLE: LEFT CHEEK
LOCATION SIMPLE: LEFT UPPER ARM
LOCATION SIMPLE: RIGHT UPPER BACK

## 2025-06-25 ASSESSMENT — LOCATION ZONE DERM
LOCATION ZONE: ARM
LOCATION ZONE: FACE
LOCATION ZONE: LEG
LOCATION ZONE: SCALP
LOCATION ZONE: TRUNK

## 2025-06-25 NOTE — PROCEDURE: LIQUID NITROGEN
Spray Paint Text: The liquid nitrogen was applied to the skin utilizing a spray paint frosting technique.
Render Post-Care Instructions In Note?: no
Show Aperture Variable?: Yes
Number Of Freeze-Thaw Cycles: 3 freeze-thaw cycles
Medical Necessity Information: It is in your best interest to select a reason for this procedure from the list below. All of these items fulfill various CMS LCD requirements except the new and changing color options.
Medical Necessity Clause: This procedure was medically necessary because the lesions that were treated were:
Detail Level: Detailed
Post-Care Instructions: I reviewed with the patient in detail post-care instructions. Patient is to wear sunprotection, and avoid picking at any of the treated lesions. Pt may apply Vaseline to crusted or scabbing areas.
Consent: The patient's consent was obtained including but not limited to risks of crusting, scabbing, blistering, scarring, darker or lighter pigmentary change, recurrence, incomplete removal and infection.

## 2025-06-25 NOTE — PROCEDURE: ADDITIONAL NOTES
Render Risk Assessment In Note?: no
Detail Level: Simple
Additional Notes: Interested in treatment, information provided for cosmetic destruction with Florencia Monroy

## 2025-06-27 ENCOUNTER — TELEPHONE (OUTPATIENT)
Dept: MEDICAL GROUP | Facility: IMAGING CENTER | Age: 55
End: 2025-06-27

## 2025-06-27 ENCOUNTER — OFFICE VISIT (OUTPATIENT)
Dept: MEDICAL GROUP | Facility: IMAGING CENTER | Age: 55
End: 2025-06-27
Payer: COMMERCIAL

## 2025-06-27 VITALS
TEMPERATURE: 97.9 F | SYSTOLIC BLOOD PRESSURE: 120 MMHG | HEIGHT: 70 IN | DIASTOLIC BLOOD PRESSURE: 70 MMHG | HEART RATE: 75 BPM | RESPIRATION RATE: 18 BRPM | OXYGEN SATURATION: 94 % | WEIGHT: 222.6 LBS | BODY MASS INDEX: 31.87 KG/M2

## 2025-06-27 DIAGNOSIS — E66.09 CLASS 1 OBESITY DUE TO EXCESS CALORIES WITHOUT SERIOUS COMORBIDITY WITH BODY MASS INDEX (BMI) OF 31.0 TO 31.9 IN ADULT: ICD-10-CM

## 2025-06-27 DIAGNOSIS — E66.811 CLASS 1 OBESITY DUE TO EXCESS CALORIES WITHOUT SERIOUS COMORBIDITY WITH BODY MASS INDEX (BMI) OF 31.0 TO 31.9 IN ADULT: Primary | ICD-10-CM

## 2025-06-27 DIAGNOSIS — E66.811 CLASS 1 OBESITY DUE TO EXCESS CALORIES WITHOUT SERIOUS COMORBIDITY WITH BODY MASS INDEX (BMI) OF 31.0 TO 31.9 IN ADULT: ICD-10-CM

## 2025-06-27 DIAGNOSIS — M25.462 SWELLING OF JOINT, KNEE, LEFT: ICD-10-CM

## 2025-06-27 DIAGNOSIS — E66.09 CLASS 1 OBESITY DUE TO EXCESS CALORIES WITHOUT SERIOUS COMORBIDITY WITH BODY MASS INDEX (BMI) OF 31.0 TO 31.9 IN ADULT: Primary | ICD-10-CM

## 2025-06-27 DIAGNOSIS — R73.01 IMPAIRED FASTING GLUCOSE: ICD-10-CM

## 2025-06-27 DIAGNOSIS — D72.10 EOSINOPHILIA, UNSPECIFIED TYPE: ICD-10-CM

## 2025-06-27 DIAGNOSIS — E55.9 VITAMIN D INSUFFICIENCY: ICD-10-CM

## 2025-06-27 DIAGNOSIS — E67.8 EXCESSIVE VITAMIN B6 INTAKE: ICD-10-CM

## 2025-06-27 DIAGNOSIS — R20.0 ANTERIOR THIGH NUMBNESS: ICD-10-CM

## 2025-06-27 PROBLEM — E53.1 VITAMIN B6 DEFICIENCY: Status: ACTIVE | Noted: 2025-06-27

## 2025-06-27 RX ORDER — NALTREXONE HYDROCHLORIDE 50 MG/1
25 TABLET, FILM COATED ORAL DAILY
Qty: 45 TABLET | Refills: 1 | Status: SHIPPED | OUTPATIENT
Start: 2025-06-27

## 2025-06-27 RX ORDER — DIETHYLPROPION HYDROCHLORIDE 25 MG/1
TABLET ORAL
Refills: 0 | OUTPATIENT
Start: 2025-06-27

## 2025-06-27 RX ORDER — BUPROPION HYDROCHLORIDE 150 MG/1
150 TABLET ORAL EVERY MORNING
Qty: 90 TABLET | Refills: 1 | Status: SHIPPED | OUTPATIENT
Start: 2025-06-27

## 2025-06-27 SDOH — HEALTH STABILITY: PHYSICAL HEALTH: ON AVERAGE, HOW MANY DAYS PER WEEK DO YOU ENGAGE IN MODERATE TO STRENUOUS EXERCISE (LIKE A BRISK WALK)?: 5 DAYS

## 2025-06-27 SDOH — ECONOMIC STABILITY: FOOD INSECURITY: WITHIN THE PAST 12 MONTHS, THE FOOD YOU BOUGHT JUST DIDN'T LAST AND YOU DIDN'T HAVE MONEY TO GET MORE.: NEVER TRUE

## 2025-06-27 SDOH — HEALTH STABILITY: PHYSICAL HEALTH: ON AVERAGE, HOW MANY MINUTES DO YOU ENGAGE IN EXERCISE AT THIS LEVEL?: 20 MIN

## 2025-06-27 SDOH — ECONOMIC STABILITY: INCOME INSECURITY: HOW HARD IS IT FOR YOU TO PAY FOR THE VERY BASICS LIKE FOOD, HOUSING, MEDICAL CARE, AND HEATING?: NOT VERY HARD

## 2025-06-27 SDOH — ECONOMIC STABILITY: INCOME INSECURITY: IN THE LAST 12 MONTHS, WAS THERE A TIME WHEN YOU WERE NOT ABLE TO PAY THE MORTGAGE OR RENT ON TIME?: NO

## 2025-06-27 SDOH — ECONOMIC STABILITY: FOOD INSECURITY: WITHIN THE PAST 12 MONTHS, YOU WORRIED THAT YOUR FOOD WOULD RUN OUT BEFORE YOU GOT MONEY TO BUY MORE.: NEVER TRUE

## 2025-06-27 ASSESSMENT — LIFESTYLE VARIABLES
AUDIT-C TOTAL SCORE: 0
HOW MANY STANDARD DRINKS CONTAINING ALCOHOL DO YOU HAVE ON A TYPICAL DAY: PATIENT DOES NOT DRINK
SKIP TO QUESTIONS 9-10: 1
HOW OFTEN DO YOU HAVE A DRINK CONTAINING ALCOHOL: NEVER
HOW OFTEN DO YOU HAVE SIX OR MORE DRINKS ON ONE OCCASION: NEVER

## 2025-06-27 ASSESSMENT — SOCIAL DETERMINANTS OF HEALTH (SDOH)
WITHIN THE PAST 12 MONTHS, YOU WORRIED THAT YOUR FOOD WOULD RUN OUT BEFORE YOU GOT THE MONEY TO BUY MORE: NEVER TRUE
HOW MANY DRINKS CONTAINING ALCOHOL DO YOU HAVE ON A TYPICAL DAY WHEN YOU ARE DRINKING: PATIENT DOES NOT DRINK
IN A TYPICAL WEEK, HOW MANY TIMES DO YOU TALK ON THE PHONE WITH FAMILY, FRIENDS, OR NEIGHBORS?: MORE THAN THREE TIMES A WEEK
HOW OFTEN DO YOU ATTENT MEETINGS OF THE CLUB OR ORGANIZATION YOU BELONG TO?: MORE THAN 4 TIMES PER YEAR
HOW OFTEN DO YOU HAVE A DRINK CONTAINING ALCOHOL: NEVER
HOW OFTEN DO YOU ATTEND CHURCH OR RELIGIOUS SERVICES?: MORE THAN 4 TIMES PER YEAR
IN A TYPICAL WEEK, HOW MANY TIMES DO YOU TALK ON THE PHONE WITH FAMILY, FRIENDS, OR NEIGHBORS?: MORE THAN THREE TIMES A WEEK
HOW OFTEN DO YOU HAVE SIX OR MORE DRINKS ON ONE OCCASION: NEVER
HOW OFTEN DO YOU GET TOGETHER WITH FRIENDS OR RELATIVES?: ONCE A WEEK
IN THE PAST 12 MONTHS, HAS THE ELECTRIC, GAS, OIL, OR WATER COMPANY THREATENED TO SHUT OFF SERVICE IN YOUR HOME?: NO
HOW HARD IS IT FOR YOU TO PAY FOR THE VERY BASICS LIKE FOOD, HOUSING, MEDICAL CARE, AND HEATING?: NOT VERY HARD
HOW OFTEN DO YOU ATTEND CHURCH OR RELIGIOUS SERVICES?: MORE THAN 4 TIMES PER YEAR
DO YOU BELONG TO ANY CLUBS OR ORGANIZATIONS SUCH AS CHURCH GROUPS UNIONS, FRATERNAL OR ATHLETIC GROUPS, OR SCHOOL GROUPS?: YES
HOW OFTEN DO YOU ATTENT MEETINGS OF THE CLUB OR ORGANIZATION YOU BELONG TO?: MORE THAN 4 TIMES PER YEAR
DO YOU BELONG TO ANY CLUBS OR ORGANIZATIONS SUCH AS CHURCH GROUPS UNIONS, FRATERNAL OR ATHLETIC GROUPS, OR SCHOOL GROUPS?: YES
HOW OFTEN DO YOU GET TOGETHER WITH FRIENDS OR RELATIVES?: ONCE A WEEK

## 2025-06-27 ASSESSMENT — FIBROSIS 4 INDEX: FIB4 SCORE: 1.13

## 2025-06-27 NOTE — TELEPHONE ENCOUNTER
Class 1 obesity due to excess calories without serious comorbidity with body mass index (BMI) of 31.0 to 31.9 in adult    Insurance does not cover our first option: Zepbound  -     buPROPion (WELLBUTRIN XL) 150 MG XL tablet; Take 1 Tablet by mouth every morning.  -     naltrexone (DEPADE) 50 MG Tab; Take 0.5 Tablets by mouth every day.

## 2025-06-27 NOTE — PROGRESS NOTES
Established Patient    Juwan Velez is a 55 y.o. male who presents today with the following:    CC:   Chief Complaint   Patient presents with    Lab Results     6/17 labs       HPI:     Verbal consent was acquired by the patient to use [employer] ambient listening note generation during this visit Yes     History of Present Illness  The patient presents for weight management    Intermittent Right Thigh Numbness  He reports intermittent right thigh numbness during activities like standing or walking, despite avoiding tight clothing and emptying pockets. Referred to a neurologist but has not scheduled an appointment due to recent travel.  - Onset: During activities like standing or walking.  - Location: Right thigh.  - Character: Numbness.  - Alleviating/Aggravating Factors: Avoiding tight clothing and emptying pockets.  - Timing: Intermittent.    Weight Loss Struggles  He aims to lose weight using a Peloton bike but struggles since turning 52. Goal weight is 200-205 pounds, lowest recent weight is 215 pounds. He does not drink alcohol but smokes. Has not consulted a nutritionist. Suspects slow metabolism.  - Onset: Since turning 52.  - Character: Struggles with weight loss.  - Alleviating/Aggravating Factors: Uses Peloton bike, does not drink alcohol, smokes.  - Severity: Goal weight is 200-205 pounds, lowest recent weight is 215 pounds.    Cholesterol Management  Currently on cholesterol medication.    Supplemental Information  Takes multivitamin and cranberry pills. Occasionally uses Claritin-D for allergies. History of tapeworm infection treated at age 22 in [place].    Bilateral Knee Swelling  During the COVID-19 pandemic, experienced bilateral knee swelling while hunting. Recently noticed left knee swelling, possibly due to lack of strength. Considering specialist referral. Finds relief with yoga, no current pain or recent injuries.  - Onset: During the COVID-19 pandemic.  - Location: Bilateral knees,  "recently left knee.  - Character: Swelling.  - Alleviating/Aggravating Factors: Finds relief with yoga.  - Timing: Recently noticed left knee swelling.  - Severity: No current pain or recent injuries.    SOCIAL HISTORY  - Does not drink alcohol  - Smokes  - Works in logistics    FAMILY HISTORY  - No family history of pancreatitis or gallstones    Problem   Eosinophilia   Vitamin B6 Deficiency   Impaired Fasting Glucose   Anterior Thigh Numbness        Current Outpatient Medications   Medication Sig    Tirzepatide-Weight Management 2.5 MG/0.5ML Solution vial Inject 0.5 mL under the skin every 7 days for 28 days.    atorvastatin (LIPITOR) 20 MG Tab Take 1 Tablet by mouth every evening.     Allergies[1]    Allergies, past medical history, past surgical history, medications, family history, social history reviewed and updated.    ROS Please see HPI    Physical Exam  Vitals: /70 (BP Location: Right arm, Patient Position: Sitting, BP Cuff Size: Adult)   Pulse 75   Temp 36.6 °C (97.9 °F) (Temporal)   Resp 18   Ht 1.778 m (5' 10\")   Wt 101 kg (222 lb 9.6 oz)   SpO2 94%   BMI 31.94 kg/m²   Physical Exam  Constitutional:       General: He is not in acute distress.     Appearance: Normal appearance.   HENT:      Head: Normocephalic and atraumatic.      Nose: Nose normal.      Mouth/Throat:      Pharynx: Oropharynx is clear.   Eyes:      Extraocular Movements: Extraocular movements intact.      Conjunctiva/sclera: Conjunctivae normal.   Cardiovascular:      Rate and Rhythm: Normal rate and regular rhythm.   Pulmonary:      Effort: Pulmonary effort is normal.      Breath sounds: Normal breath sounds.   Abdominal:      General: Bowel sounds are normal.      Palpations: Abdomen is soft.      Tenderness: There is no abdominal tenderness.   Musculoskeletal:      Right lower leg: No edema.      Left lower leg: No edema.   Neurological:      Mental Status: He is alert. Mental status is at baseline.   Psychiatric:         " Mood and Affect: Mood normal.         Behavior: Behavior normal.         Thought Content: Thought content normal.         Judgment: Judgment normal.         Labs (6/17/25) were reviewed and discussed with patients.    All questions were answered.     Latest Reference Range & Units 06/17/25 06:36   WBC 4.8 - 10.8 K/uL 5.5   RBC 4.70 - 6.10 M/uL 5.12   Hemoglobin 14.0 - 18.0 g/dL 16.1   Hematocrit 42.0 - 52.0 % 47.6   MCV 81.4 - 97.8 fL 93.0   MCH 27.0 - 33.0 pg 31.4   MCHC 32.3 - 36.5 g/dL 33.8   RDW 35.9 - 50.0 fL 42.4   Platelet Count 164 - 446 K/uL 221   MPV 9.0 - 12.9 fL 10.5   Neutrophils-Polys 44.00 - 72.00 % 53.30   Neutrophils (Absolute) 1.82 - 7.42 K/uL 2.94   Lymphocytes 22.00 - 41.00 % 25.60   Lymphs (Absolute) 1.00 - 4.80 K/uL 1.41   Monocytes 0.00 - 13.40 % 9.80   Monos (Absolute) 0.00 - 0.85 K/uL 0.54   Eosinophils 0.00 - 6.90 % 10.20 (H)   Eos (Absolute) 0.00 - 0.51 K/uL 0.56 (H)   Basophils 0.00 - 1.80 % 0.90   Baso (Absolute) 0.00 - 0.12 K/uL 0.05   Immature Granulocytes 0.00 - 0.90 % 0.20   Immature Granulocytes (abs) 0.00 - 0.11 K/uL 0.01   Nucleated RBC 0.00 - 0.20 /100 WBC 0.00   NRBC (Absolute) K/uL 0.00   Sodium 135 - 145 mmol/L 140   Potassium 3.6 - 5.5 mmol/L 4.3   Chloride 96 - 112 mmol/L 106   Co2 20 - 33 mmol/L 27   Anion Gap 7.0 - 16.0  7.0   Glucose 65 - 99 mg/dL 101 (H)   Bun 8 - 22 mg/dL 17   Creatinine 0.50 - 1.40 mg/dL 1.08   GFR (CKD-EPI) >60 mL/min/1.73 m 2 82   Calcium 8.5 - 10.5 mg/dL 9.0   Correct Calcium 8.5 - 10.5 mg/dL 8.9   AST(SGOT) 12 - 45 U/L 21   ALT(SGPT) 2 - 50 U/L 20   Alkaline Phosphatase 30 - 99 U/L 83   Total Bilirubin 0.1 - 1.5 mg/dL 0.4   Albumin 3.2 - 4.9 g/dL 4.1   Total Protein 6.0 - 8.2 g/dL 6.6   Globulin 1.9 - 3.5 g/dL 2.5   A-G Ratio g/dL 1.6   Glycohemoglobin 4.0 - 5.6 % 5.5   Estim. Avg Glu mg/dL 111   Fasting Status  Fasting   Cholesterol,Tot 100 - 199 mg/dL 168   Triglycerides 0 - 149 mg/dL 185 (H)   HDL >=40 mg/dL 39 !   LDL <100 mg/dL 92    Urobilinogen, Urine <=1.0 EU/dL 0.2   Color  Yellow   Character  Clear   Specific Gravity <1.035  1.022   Ph 5.0 - 8.0  5.5   Glucose Negative mg/dL Negative   Ketones Negative mg/dL Negative   Bilirubin Negative  Negative   Occult Blood Negative  Negative   Protein Negative mg/dL Negative   Nitrite Negative  Negative   Leukocyte Esterase Negative  Negative   Micro Urine Req  see below   25-Hydroxy   Vitamin D 25 30 - 100 ng/mL 26 (L)   Prostatic Specific Antigen Tot 0.00 - 4.00 ng/mL 0.55   Vitamin B1 70 - 180 nmol/L 198 (H)   Vitamin B12 -True Cobalamin 211 - 911 pg/mL 484   Vitamin B6 20.0 - 125.0 nmol/L 148.3 (H)   TSH 0.380 - 5.330 uIU/mL 2.530   Hep B Surface Antibody Quant 0.00 - 10.00 mIU/mL <3.50   Hepatitis B Surface Antigen Non-Reactive  Non-Reactive   Hepatitis C Antibody Non-Reactive  Non-Reactive   HIV Ag/Ab Combo Assay Non Reactive  Non-Reactive   (H): Data is abnormally high  !: Data is abnormal  (L): Data is abnormally low    Assessment and Plan    Assessment & Plan      1. Class 1 obesity due to excess calories without serious comorbidity with body mass index (BMI) of 31.0 to 31.9 in adult  Body mass index is 31.94 kg/m².    - Tirzepatide-Weight Management 2.5 MG/0.5ML Solution vial; Inject 0.5 mL under the skin every 7 days for 28 days.  Dispense: 2 mL; Refill: 3  Healthful lifestyle measures  Extensive counseling provided  Denies hx of thyroid tumor, gallstones, pancreatitis  Encourage resistance and aerobic exercises  Benefits, risks, and adverse reactions of medication discussed. Patient is agreeable with initiating the medication.     2. Impaired fasting glucose  A1c 5.5  - HEMOGLOBIN A1C; Future  - Comp Metabolic Panel; Future  - Healthful lifestyle measures    - cut down on sugary foods/beverages, and refined starches)  - stay active    3. Eosinophilia, unspecified type  - Complete O&P; Future  - CBC WITH DIFFERENTIAL; Future  History of traveling to [place]    4. Excessive vitamin B6  intake  - VITAMIN B6; Future  Elevated.  - Vitamin B6 level 148.  - Discontinue supplements containing vitamin B6 and monitor levels.        5. Vitamin D insufficiency  Recommend vitamin D3 1366-0051 international units daily    6. Swelling of joint, knee, left  - Referral to Orthopedics  ccasional.  - Referral to orthopedic specialist for evaluation and potential physical therapy.  - Continue yoga exercises.    7. Anterior thigh numbness  Conservative management: remove compressive factors (Weight loss, loose clothing )  Pending appointment with neurology    Hepatitis B non-immunity: Not immune.  - Recommended vaccination due to frequent travel to [place].  - Hepatitis B vaccine declined.  - Discussed transmission risks and benefits of vaccination.    My total time spent caring for the patient on the day of the encounter was 40 minutes.   This does not include time spent on separately billable procedures/tests.      Follow-up:Return in about 3 months (around 9/27/2025), or if symptoms worsen or fail to improve.    This note was created using voice recognition software. There may be unintended errors in spelling, grammar or content.           [1] No Known Allergies

## 2025-06-27 NOTE — TELEPHONE ENCOUNTER
Pharmacy comment: Alternative Requested:THE PRESCRIBED MEDICATION IS NOT COVERED BY INSURANCE. PLEASE CONSIDER CHANGING TO ONE OF THE SUGGESTED COVERED ALTERNATIVES.

## 2025-07-07 NOTE — Clinical Note
REFERRAL APPROVAL NOTICE         Sent on July 7, 2025                   Juwan Verduzco Rosie  [address] [place] [address]                   Dear Mr. Velez,    After a careful review of the medical information and benefit coverage, [facility] has processed your referral. See below for additional details.    If applicable, you must be actively enrolled with your insurance for coverage of the authorized service. If you have any questions regarding your coverage, please contact your insurance directly.    REFERRAL INFORMATION   Referral #:  [id number]  Referred-To Department    Referred-By Provider:  Orthopedics    Kimani Yu M.D.   [facility] (joint)      [address] [place] [address]  [phone] [address]  [place] [id number] [phone]    Referral Start Date:  06/27/2025  Referral End Date:   06/27/2026             SCHEDULING  If you do not already have an appointment, please call [phone] to make an appointment.     MORE INFORMATION  If you do not already have a [email or web address] account, sign up at: [email or web address]  You can access your medical information, make appointments, see lab results, billing information, and more.  If you have questions regarding this referral, please contact  the [facility] Referrals department at:             [phone]. Monday - Friday 8:00AM - 5:00PM.     Sincerely,    [facility]

## 2025-07-07 NOTE — Clinical Note
REFERRAL APPROVAL NOTICE         Sent on July 7, 2025                   Juwan Verduzco Rosie  [address] [place] [address]                   Dear Mr. Velez,    After a careful review of the medical information and benefit coverage, [facility] has processed your referral. See below for additional details.    If applicable, you must be actively enrolled with your insurance for coverage of the authorized service. If you have any questions regarding your coverage, please contact your insurance directly.    REFERRAL INFORMATION   Referral #:  [id number]  Referred-To Department    Referred-By Provider:  Latoya Yu M.D.   [facility] [address] [place] [address]  [phone] [address]  [place] [id number] [phone]    Referral Start Date:  06/27/2025  Referral End Date:   06/27/2026             SCHEDULING  If you do not already have an appointment, please call [phone] to make an appointment.     MORE INFORMATION  If you do not already have a [employer] account, sign up at: [email or web address]  You can access your medical information, make appointments, see lab results, billing information, and more.  If you have questions regarding this referral, please contact  the [facility] Referrals department at:             [phone]. Monday - Friday 8:00AM - 5:00PM.     Sincerely,    [facility]